# Patient Record
Sex: MALE | Race: WHITE | NOT HISPANIC OR LATINO | Employment: OTHER | URBAN - METROPOLITAN AREA
[De-identification: names, ages, dates, MRNs, and addresses within clinical notes are randomized per-mention and may not be internally consistent; named-entity substitution may affect disease eponyms.]

---

## 2018-10-23 ENCOUNTER — APPOINTMENT (OUTPATIENT)
Dept: LAB | Facility: MEDICAL CENTER | Age: 83
End: 2018-10-23
Payer: MEDICARE

## 2018-10-23 ENCOUNTER — TRANSCRIBE ORDERS (OUTPATIENT)
Dept: LAB | Facility: MEDICAL CENTER | Age: 83
End: 2018-10-23

## 2018-10-23 DIAGNOSIS — I10 HYPERTENSION, UNSPECIFIED TYPE: ICD-10-CM

## 2018-10-23 DIAGNOSIS — E78.5 HYPERLIPIDEMIA, UNSPECIFIED HYPERLIPIDEMIA TYPE: ICD-10-CM

## 2018-10-23 DIAGNOSIS — K21.9 GASTROESOPHAGEAL REFLUX DISEASE, ESOPHAGITIS PRESENCE NOT SPECIFIED: ICD-10-CM

## 2018-10-23 DIAGNOSIS — F03.90 DEMENTIA WITHOUT BEHAVIORAL DISTURBANCE, UNSPECIFIED DEMENTIA TYPE (HCC): Primary | ICD-10-CM

## 2018-10-23 DIAGNOSIS — F03.90 DEMENTIA WITHOUT BEHAVIORAL DISTURBANCE, UNSPECIFIED DEMENTIA TYPE (HCC): ICD-10-CM

## 2018-10-23 LAB
ALBUMIN SERPL BCP-MCNC: 3.9 G/DL (ref 3.5–5)
ALP SERPL-CCNC: 61 U/L (ref 46–116)
ALT SERPL W P-5'-P-CCNC: 24 U/L (ref 12–78)
ANION GAP SERPL CALCULATED.3IONS-SCNC: 6 MMOL/L (ref 4–13)
AST SERPL W P-5'-P-CCNC: 16 U/L (ref 5–45)
BILIRUB SERPL-MCNC: 0.75 MG/DL (ref 0.2–1)
BUN SERPL-MCNC: 11 MG/DL (ref 5–25)
CALCIUM SERPL-MCNC: 9.2 MG/DL (ref 8.3–10.1)
CHLORIDE SERPL-SCNC: 104 MMOL/L (ref 100–108)
CHOLEST SERPL-MCNC: 159 MG/DL (ref 50–200)
CO2 SERPL-SCNC: 31 MMOL/L (ref 21–32)
CREAT SERPL-MCNC: 0.97 MG/DL (ref 0.6–1.3)
ERYTHROCYTE [DISTWIDTH] IN BLOOD BY AUTOMATED COUNT: 12.9 % (ref 11.6–15.1)
GFR SERPL CREATININE-BSD FRML MDRD: 71 ML/MIN/1.73SQ M
GLUCOSE SERPL-MCNC: 82 MG/DL (ref 65–140)
HCT VFR BLD AUTO: 47.4 % (ref 36.5–49.3)
HDLC SERPL-MCNC: 34 MG/DL (ref 40–60)
HGB BLD-MCNC: 15.8 G/DL (ref 12–17)
LDLC SERPL CALC-MCNC: 73 MG/DL (ref 0–100)
MCH RBC QN AUTO: 30.9 PG (ref 26.8–34.3)
MCHC RBC AUTO-ENTMCNC: 33.3 G/DL (ref 31.4–37.4)
MCV RBC AUTO: 93 FL (ref 82–98)
NONHDLC SERPL-MCNC: 125 MG/DL
PLATELET # BLD AUTO: 178 THOUSANDS/UL (ref 149–390)
PMV BLD AUTO: 10.9 FL (ref 8.9–12.7)
POTASSIUM SERPL-SCNC: 4.2 MMOL/L (ref 3.5–5.3)
PROT SERPL-MCNC: 7.4 G/DL (ref 6.4–8.2)
RBC # BLD AUTO: 5.11 MILLION/UL (ref 3.88–5.62)
SODIUM SERPL-SCNC: 141 MMOL/L (ref 136–145)
TRIGL SERPL-MCNC: 260 MG/DL
WBC # BLD AUTO: 6.36 THOUSAND/UL (ref 4.31–10.16)

## 2018-10-23 PROCEDURE — 85027 COMPLETE CBC AUTOMATED: CPT

## 2018-10-23 PROCEDURE — 80053 COMPREHEN METABOLIC PANEL: CPT

## 2018-10-23 PROCEDURE — 80061 LIPID PANEL: CPT

## 2018-10-23 PROCEDURE — 36415 COLL VENOUS BLD VENIPUNCTURE: CPT

## 2021-07-03 ENCOUNTER — APPOINTMENT (EMERGENCY)
Dept: RADIOLOGY | Facility: HOSPITAL | Age: 86
End: 2021-07-03
Payer: MEDICARE

## 2021-07-03 ENCOUNTER — APPOINTMENT (EMERGENCY)
Dept: CT IMAGING | Facility: HOSPITAL | Age: 86
End: 2021-07-03
Payer: MEDICARE

## 2021-07-03 ENCOUNTER — HOSPITAL ENCOUNTER (EMERGENCY)
Facility: HOSPITAL | Age: 86
Discharge: HOME | End: 2021-07-03
Attending: EMERGENCY MEDICINE | Admitting: FAMILY MEDICINE
Payer: MEDICARE

## 2021-07-03 DIAGNOSIS — D72.829 LEUKOCYTOSIS: ICD-10-CM
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
## 2021-07-03 LAB
ANION GAP SERPL CALCULATED.3IONS-SCNC: 12 MMOL/L (ref 4–13)
APTT PPP: 29 SECONDS (ref 23–37)
ATRIAL RATE: 69 BPM
BACTERIA UR QL AUTO: ABNORMAL /HPF
BASOPHILS # BLD AUTO: 0 THOUSANDS/ΜL (ref 0–0.1)
BASOPHILS NFR BLD AUTO: 0 % (ref 0–2)
BILIRUB UR QL STRIP: NEGATIVE
BUN SERPL-MCNC: 10 MG/DL (ref 7–25)
CALCIUM SERPL-MCNC: 9.5 MG/DL (ref 8.6–10.5)
CHLORIDE SERPL-SCNC: 100 MMOL/L (ref 98–107)
CLARITY UR: CLEAR
CO2 SERPL-SCNC: 27 MMOL/L (ref 21–31)
COARSE GRAN CASTS URNS QL MICRO: ABNORMAL /LPF
COLOR UR: YELLOW
CREAT SERPL-MCNC: 1.26 MG/DL (ref 0.7–1.3)
EOSINOPHIL # BLD AUTO: 0 THOUSAND/ΜL (ref 0–0.61)
EOSINOPHIL NFR BLD AUTO: 0 % (ref 0–5)
ERYTHROCYTE [DISTWIDTH] IN BLOOD BY AUTOMATED COUNT: 13.1 % (ref 11.5–14.5)
GFR SERPL CREATININE-BSD FRML MDRD: 51 ML/MIN/1.73SQ M
GLUCOSE SERPL-MCNC: 128 MG/DL (ref 65–140)
GLUCOSE SERPL-MCNC: 129 MG/DL (ref 65–99)
GLUCOSE UR STRIP-MCNC: NEGATIVE MG/DL
HCT VFR BLD AUTO: 47.1 % (ref 42–47)
HGB BLD-MCNC: 16 G/DL (ref 14–18)
HGB UR QL STRIP.AUTO: ABNORMAL
INR PPP: 1.07 (ref 0.84–1.19)
KETONES UR STRIP-MCNC: NEGATIVE MG/DL
LEUKOCYTE ESTERASE UR QL STRIP: NEGATIVE
LYMPHOCYTES # BLD AUTO: 0.9 THOUSANDS/ΜL (ref 0.6–4.47)
LYMPHOCYTES NFR BLD AUTO: 5 % (ref 21–51)
MCH RBC QN AUTO: 30.7 PG (ref 26–34)
MCHC RBC AUTO-ENTMCNC: 33.9 G/DL (ref 31–37)
MCV RBC AUTO: 91 FL (ref 81–99)
MONOCYTES # BLD AUTO: 1.8 THOUSAND/ΜL (ref 0.17–1.22)
MONOCYTES NFR BLD AUTO: 10 % (ref 2–12)
MUCOUS THREADS UR QL AUTO: ABNORMAL
NEUTROPHILS # BLD AUTO: 15 THOUSANDS/ΜL (ref 1.4–6.5)
NEUTS SEG NFR BLD AUTO: 84 % (ref 42–75)
NITRITE UR QL STRIP: NEGATIVE
NON-SQ EPI CELLS URNS QL MICRO: ABNORMAL /HPF
P AXIS: 61 DEGREES
PH UR STRIP.AUTO: 7.5 [PH]
PLATELET # BLD AUTO: 196 THOUSANDS/UL (ref 149–390)
PMV BLD AUTO: 8.9 FL (ref 8.6–11.7)
POTASSIUM SERPL-SCNC: 3.7 MMOL/L (ref 3.5–5.5)
PR INTERVAL: 164 MS
PROT UR STRIP-MCNC: ABNORMAL MG/DL
PROTHROMBIN TIME: 13.8 SECONDS (ref 11.6–14.5)
QRS AXIS: 68 DEGREES
QRSD INTERVAL: 82 MS
QT INTERVAL: 398 MS
QTC INTERVAL: 426 MS
RBC # BLD AUTO: 5.21 MILLION/UL (ref 4.3–5.9)
RBC #/AREA URNS AUTO: ABNORMAL /HPF
SODIUM SERPL-SCNC: 139 MMOL/L (ref 134–143)
SP GR UR STRIP.AUTO: 1.02 (ref 1–1.03)
T WAVE AXIS: 64 DEGREES
UROBILINOGEN UR QL STRIP.AUTO: 0.2 E.U./DL
VENTRICULAR RATE: 69 BPM
WBC # BLD AUTO: 17.8 THOUSAND/UL (ref 4.8–10.8)
WBC #/AREA URNS AUTO: ABNORMAL /HPF

## 2021-07-03 PROCEDURE — 93010 ELECTROCARDIOGRAM REPORT: CPT | Performed by: INTERNAL MEDICINE

## 2021-07-03 PROCEDURE — 85610 PROTHROMBIN TIME: CPT | Performed by: EMERGENCY MEDICINE

## 2021-07-03 PROCEDURE — 85730 THROMBOPLASTIN TIME PARTIAL: CPT | Performed by: EMERGENCY MEDICINE

## 2021-07-03 PROCEDURE — 85025 COMPLETE CBC W/AUTO DIFF WBC: CPT | Performed by: EMERGENCY MEDICINE

## 2021-07-03 PROCEDURE — 71045 X-RAY EXAM CHEST 1 VIEW: CPT

## 2021-07-03 PROCEDURE — 99285 EMERGENCY DEPT VISIT HI MDM: CPT | Performed by: FAMILY MEDICINE

## 2021-07-03 PROCEDURE — 72125 CT NECK SPINE W/O DYE: CPT

## 2021-07-03 PROCEDURE — 82948 REAGENT STRIP/BLOOD GLUCOSE: CPT

## 2021-07-03 PROCEDURE — 99284 EMERGENCY DEPT VISIT MOD MDM: CPT

## 2021-07-03 PROCEDURE — 80048 BASIC METABOLIC PNL TOTAL CA: CPT | Performed by: EMERGENCY MEDICINE

## 2021-07-03 PROCEDURE — 81003 URINALYSIS AUTO W/O SCOPE: CPT | Performed by: EMERGENCY MEDICINE

## 2021-07-03 PROCEDURE — 36415 COLL VENOUS BLD VENIPUNCTURE: CPT | Performed by: EMERGENCY MEDICINE

## 2021-07-03 PROCEDURE — 81001 URINALYSIS AUTO W/SCOPE: CPT | Performed by: EMERGENCY MEDICINE

## 2021-07-03 PROCEDURE — 70450 CT HEAD/BRAIN W/O DYE: CPT

## 2021-07-03 PROCEDURE — 93005 ELECTROCARDIOGRAM TRACING: CPT

## 2021-07-03 PROCEDURE — 73552 X-RAY EXAM OF FEMUR 2/>: CPT

## 2021-07-03 NOTE — ED PROVIDER NOTES
Emergency Department Trauma Note  Domonique Leon 80 y o  male MRN: [id number]  Unit/Bed#: TR 05/TR 05 Encounter: [id number]      Trauma Alert: Trauma Acuity: Trauma Evaluation  Model of Arrival:   via    Trauma Team: Current Providers  Attending Provider: Myles Powers MD  Attending Provider: Theresa Merino MD  Registered Nurse: Conchita Cardozo RN  Consultants: None      History of Present Illness     Chief Complaint:   Chief Complaint   Patient presents with    Fall     HPI:  Domonique Leon is a 80 y o  male who presents with fall  Fall was unwitnessed patient has severe dementia so history is limited secondary to dementia  As per EMS the patient was found on the floor  He did complain of neck pain C-collar was placed by EMS prior to arrival   Patient complains of left-sided leg pain denies any headache blurry vision double vision  Denies any chest pain or shortness of breath  Denies any abdominal pain nausea vomiting or diarrhea  He is awake alert oriented at baseline as per EMS and family  GCS 15  Mechanism:Details of Incident: patient fell at home  onto floor Injury Date: 07/03/21 Injury Time: 2444      HPI  Review of Systems   Constitutional: Negative  HENT: Negative  Eyes: Negative  Respiratory: Negative  Cardiovascular: Negative  Gastrointestinal: Negative  Endocrine: Negative  Genitourinary: Negative  Musculoskeletal: Positive for neck pain  Left thigh pain    Skin: Negative  Neurological: Negative  Psychiatric/Behavioral: Negative  Historical Information     Immunizations: There is no immunization history on file for this patient  No past medical history on file  No family history on file  No past surgical history on file  Social History     Tobacco Use    Smoking status: Not on file   Substance Use Topics    Alcohol use: Not on file    Drug use: Not on file     No existing history information found    No existing history information found  Family History: non-contributory    Meds/Allergies   None       Not on File    PHYSICAL EXAM    PE limited by:     Objective   Vitals:   First set: Temperature: 98 3 °F (36 8 °C) (07/03/21 0711)  Pulse: 68 (07/03/21 0711)  Respirations: 20 (07/03/21 0711)  Blood Pressure: 148/78 (07/03/21 0711)  SpO2: 94 % (07/03/21 0711)    Primary Survey:   (A) Airway:  Patent no blood in the oropharynx  (B) Breathing:  Lungs are clear to auscultation bilateral  (C) Circulation: Pulses:   normal  (D) Disabliity:  GCS Total:  15  (E) Expose:  Completed    Secondary Survey: (Click on Physical Exam tab above)  Physical Exam  Vitals and nursing note reviewed  Constitutional:       Appearance: Normal appearance  HENT:      Head: Normocephalic and atraumatic  Nose: Nose normal    Eyes:      Extraocular Movements: Extraocular movements intact  Conjunctiva/sclera: Conjunctivae normal       Pupils: Pupils are equal, round, and reactive to light  Cardiovascular:      Rate and Rhythm: Normal rate and regular rhythm  Pulmonary:      Effort: Pulmonary effort is normal       Breath sounds: Normal breath sounds  Abdominal:      General: Bowel sounds are normal       Palpations: Abdomen is soft  Musculoskeletal:         General: Tenderness (left thigh :  Tenderness to palpation able to lift his leg for few sec  States that he normally has cramps in his legs ) present  Cervical back: Neck supple  Tenderness present  Skin:     General: Skin is warm  Neurological:      General: No focal deficit present  Mental Status: He is alert and oriented to person, place, and time  Psychiatric:         Mood and Affect: Mood normal          Behavior: Behavior normal          Cervical spine cleared by clinical criteria? No (imaging required)    Cervical spine cleared at 8:28 a m  By me    Invasive Devices     None                 Lab Results:   Results Reviewed     Procedure Component Value Units Date/Time    Urine Microscopic [[id number]]  (Abnormal) Collected: 07/03/21 0922    Lab Status: Final result Specimen: Urine, Clean Catch Updated: 07/03/21 0956     RBC, UA 10-20 /hpf      WBC, UA 4-10 /hpf      Epithelial Cells Moderate /hpf      Bacteria, UA Occasional /hpf      COARSE GRANULAR CASTS 2-3 /lpf      MUCUS THREADS Moderate    UA w Reflex to Microscopic w Reflex to Culture [[id number]]  (Abnormal) Collected: 07/03/21 0922    Lab Status: Final result Specimen: Urine, Clean Catch Updated: 07/03/21 0930     Color, UA Yellow     Clarity, UA Clear     Specific [place], UA 1 020     pH, UA 7 5     Leukocytes, UA Negative     Nitrite, UA Negative     Protein, UA 2+ mg/dl      Glucose, UA Negative mg/dl      Ketones, UA Negative mg/dl      Urobilinogen, UA 0 2 E U /dl      Bilirubin, UA Negative     Blood, UA 1+    Basic metabolic panel [[id number]]  (Abnormal) Collected: 07/03/21 0808    Lab Status: Final result Specimen: Blood from Arm, Right Updated: 07/03/21 0841     Sodium 139 mmol/L      Potassium 3 7 mmol/L      Chloride 100 mmol/L      CO2 27 mmol/L      ANION GAP 12 mmol/L      BUN 10 mg/dL      Creatinine 1 26 mg/dL      Glucose 129 mg/dL      Calcium 9 5 mg/dL      eGFR 51 ml/min/1 73sq m     Narrative:      [place] guidelines for Chronic Kidney Disease (CKD):     Stage 1 with normal or high GFR (GFR > 90 mL/min/1 73 square meters)    Stage 2 Mild CKD (GFR = 60-89 mL/min/1 73 square meters)    Stage 3A Moderate CKD (GFR = 45-59 mL/min/1 73 square meters)    Stage 3B Moderate CKD (GFR = 30-44 mL/min/1 73 square meters)    Stage 4 Severe CKD (GFR = 15-29 mL/min/1 73 square meters)    Stage 5 End Stage CKD (GFR <15 mL/min/1 73 square meters)  Note: GFR calculation is accurate only with a steady state creatinine    Protime-INR [[id number]]  (Normal) Collected: 07/03/21 0808    Lab Status: Final result Specimen: Blood from Arm, Right Updated: 07/03/21 0830     Protime 13 8 seconds      INR 1  07    APTT [[id number]]  (Normal) Collected: 07/03/21 0808    Lab Status: Final result Specimen: Blood from Arm, Right Updated: 07/03/21 0830     PTT 29 seconds     CBC and differential [[id number]]  (Abnormal) Collected: 07/03/21 0808    Lab Status: Final result Specimen: Blood from Arm, Right Updated: 07/03/21 0828     WBC 17 80 Thousand/uL      RBC 5 21 Million/uL      Hemoglobin 16 0 g/dL      Hematocrit 47 1 %      MCV 91 fL      MCH 30 7 pg      MCHC 33 9 g/dL      RDW 13 1 %      MPV 8 9 fL      Platelets 359 Thousands/uL      Neutrophils Relative 84 %      Lymphocytes Relative 5 %      Monocytes Relative 10 %      Eosinophils Relative 0 %      Basophils Relative 0 %      Neutrophils Absolute 15 00 Thousands/µL      Lymphocytes Absolute 0 90 Thousands/µL      Monocytes Absolute 1 80 Thousand/µL      Eosinophils Absolute 0 00 Thousand/µL      Basophils Absolute 0 00 Thousands/µL     Fingerstick Glucose (POCT) [[id number]]  (Normal) Collected: 07/03/21 0815    Lab Status: Final result Updated: 07/03/21 0818     POC Glucose 128 mg/dl                  Imaging Studies:   Direct to CT: No  XR femur 2 views LEFT   Final Result by Berenice Perry MD (07/03 0820)      No acute osseous abnormality  Workstation performed: [id number]         TRAUMA - CT head wo contrast   Final Result by Darby Lara MD (12/96 6126)      No acute intracranial abnormality  Microangiopathic changes  The study was marked in EPIC for immediate notification given its trauma status  Workstation performed: [id number]         TRAUMA - CT spine cervical wo contrast   Final Result by Darby Lara MD (07/03 7367)      No cervical spine fracture or traumatic malalignment  The study was marked in EPIC for immediate notification given its trauma status        Workstation performed: [id number]         XR Trauma chest portable   Final Result by Berenice Perry MD (07/03 3172)      No acute cardiopulmonary disease  Workstation performed: [id number]               Procedures  Procedures         ED Course  ED Course as of Jul 03 1002   Sat Jul 03, 2021   9129 Labs chest x-ray/CT scan discussed with patient recommending to repeat CBC to trend leukocytosis  Patient blood work within normal limits  Urine is negative for infection  Discussed with family and the granddaughter who is by bedside recommending follow-up with PCP in 24-48 hours repeat blood work return to the ED symptom does not improve or worsen  MDM        Disposition  [facility] Transfer: No  Final diagnoses:   Fall, initial encounter   Leukocytosis     Time reflects when diagnosis was documented in both MDM as applicable and the Disposition within this note     Time User Action Codes Description Comment    7/3/2021  9:59 AM Lucia Quarry Add [U10  XXXA] Fall, initial encounter     7/3/2021  9:59 AM Lucia Quarry Add [T96 721] Leukocytosis       ED Disposition     ED Disposition Condition Date/Time Comment    Discharge Stable Sat Jul 3, 2021  9:59 AM Malena Holm discharge to home/self care  Follow-up Information     Follow up With Specialties Details Why [address], DO Emergency Medicine, Internal Medicine Schedule an appointment as soon as possible for a visit in 2 days If symptoms worsen Fady Cantu [phone] [facility]  [phone]          Patient's Medications    No medications on file     Outpatient Discharge Orders   CBC and differential   Standing Status: Future Standing Exp   Date: 07/03/22       PDMP Review     None          ED Provider  Electronically Signed by         Kumra Mercer MD  07/03/21 1002

## 2021-07-05 VITALS
RESPIRATION RATE: 20 BRPM | OXYGEN SATURATION: 95 % | HEART RATE: 64 BPM | SYSTOLIC BLOOD PRESSURE: 168 MMHG | TEMPERATURE: 98.2 F | DIASTOLIC BLOOD PRESSURE: 81 MMHG | WEIGHT: 168 LBS | BODY MASS INDEX: 24.11 KG/M2

## 2021-07-09 ENCOUNTER — APPOINTMENT (OUTPATIENT)
Dept: LAB | Facility: MEDICAL CENTER | Age: 86
End: 2021-07-09
Payer: MEDICARE

## 2021-07-09 DIAGNOSIS — D72.829 LEUKOCYTOSIS: ICD-10-CM

## 2021-07-09 DIAGNOSIS — F03.90 DEMENTIA WITHOUT BEHAVIORAL DISTURBANCE, UNSPECIFIED DEMENTIA TYPE (HCC): ICD-10-CM

## 2021-07-09 DIAGNOSIS — D72.829 LEUKOCYTOSIS, UNSPECIFIED TYPE: ICD-10-CM

## 2021-07-09 DIAGNOSIS — I10 HYPERTENSION, UNSPECIFIED TYPE: ICD-10-CM
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
## 2021-07-09 LAB
ANION GAP SERPL CALCULATED.3IONS-SCNC: 1 MMOL/L (ref 4–13)
BASOPHILS # BLD AUTO: 0.04 THOUSANDS/ΜL (ref 0–0.1)
BASOPHILS NFR BLD AUTO: 1 % (ref 0–1)
BUN SERPL-MCNC: 12 MG/DL (ref 5–25)
CALCIUM SERPL-MCNC: 9.3 MG/DL (ref 8.3–10.1)
CHLORIDE SERPL-SCNC: 106 MMOL/L (ref 100–108)
CO2 SERPL-SCNC: 31 MMOL/L (ref 21–32)
CREAT SERPL-MCNC: 0.98 MG/DL (ref 0.6–1.3)
EOSINOPHIL # BLD AUTO: 0.06 THOUSAND/ΜL (ref 0–0.61)
EOSINOPHIL NFR BLD AUTO: 1 % (ref 0–6)
ERYTHROCYTE [DISTWIDTH] IN BLOOD BY AUTOMATED COUNT: 12.7 % (ref 11.6–15.1)
GFR SERPL CREATININE-BSD FRML MDRD: 69 ML/MIN/1.73SQ M
GLUCOSE SERPL-MCNC: 121 MG/DL (ref 65–140)
HCT VFR BLD AUTO: 43.9 % (ref 36.5–49.3)
HGB BLD-MCNC: 14.5 G/DL (ref 12–17)
IMM GRANULOCYTES # BLD AUTO: 0.02 THOUSAND/UL (ref 0–0.2)
IMM GRANULOCYTES NFR BLD AUTO: 0 % (ref 0–2)
LYMPHOCYTES # BLD AUTO: 1.05 THOUSANDS/ΜL (ref 0.6–4.47)
LYMPHOCYTES NFR BLD AUTO: 16 % (ref 14–44)
MCH RBC QN AUTO: 31.1 PG (ref 26.8–34.3)
MCHC RBC AUTO-ENTMCNC: 33 G/DL (ref 31.4–37.4)
MCV RBC AUTO: 94 FL (ref 82–98)
MONOCYTES # BLD AUTO: 0.59 THOUSAND/ΜL (ref 0.17–1.22)
MONOCYTES NFR BLD AUTO: 9 % (ref 4–12)
NEUTROPHILS # BLD AUTO: 4.8 THOUSANDS/ΜL (ref 1.85–7.62)
NEUTS SEG NFR BLD AUTO: 73 % (ref 43–75)
NRBC BLD AUTO-RTO: 0 /100 WBCS
PLATELET # BLD AUTO: 207 THOUSANDS/UL (ref 149–390)
PMV BLD AUTO: 10.9 FL (ref 8.9–12.7)
POTASSIUM SERPL-SCNC: 4.1 MMOL/L (ref 3.5–5.3)
RBC # BLD AUTO: 4.66 MILLION/UL (ref 3.88–5.62)
SODIUM SERPL-SCNC: 138 MMOL/L (ref 136–145)
WBC # BLD AUTO: 6.56 THOUSAND/UL (ref 4.31–10.16)

## 2021-07-09 PROCEDURE — 80048 BASIC METABOLIC PNL TOTAL CA: CPT

## 2021-07-09 PROCEDURE — 36415 COLL VENOUS BLD VENIPUNCTURE: CPT

## 2021-07-09 PROCEDURE — 85025 COMPLETE CBC W/AUTO DIFF WBC: CPT

## 2022-01-01 ENCOUNTER — HOME CARE VISIT (OUTPATIENT)
Dept: HOME HOSPICE | Facility: HOSPICE | Age: 87
End: 2022-01-01

## 2022-01-01 ENCOUNTER — HOME CARE VISIT (OUTPATIENT)
Dept: HOME HEALTH SERVICES | Facility: HOME HEALTHCARE | Age: 87
End: 2022-01-01

## 2022-01-01 VITALS — HEART RATE: 100 BPM | TEMPERATURE: 97 F | RESPIRATION RATE: 30 BRPM

## 2022-01-01 VITALS — RESPIRATION RATE: 16 BRPM | TEMPERATURE: 96.2 F | HEART RATE: 100 BPM

## 2022-01-01 VITALS — TEMPERATURE: 97 F | RESPIRATION RATE: 18 BRPM | HEART RATE: 68 BPM

## 2022-10-25 ENCOUNTER — HOSPITAL ENCOUNTER (EMERGENCY)
Facility: HOSPITAL | Age: 87
Discharge: HOME | End: 2022-10-26
Attending: EMERGENCY MEDICINE
Payer: MEDICARE

## 2022-10-25 DIAGNOSIS — Z00.8 ENCOUNTER FOR PSYCHOLOGICAL EVALUATION: ICD-10-CM

## 2022-10-25 DIAGNOSIS — F03.90 DEMENTIA (HCC): Primary | ICD-10-CM

## 2022-10-25 PROCEDURE — 82075 ASSAY OF BREATH ETHANOL: CPT

## 2022-10-26 VITALS
OXYGEN SATURATION: 98 % | SYSTOLIC BLOOD PRESSURE: 175 MMHG | HEART RATE: 72 BPM | TEMPERATURE: 98.8 F | BODY MASS INDEX: 22.96 KG/M2 | DIASTOLIC BLOOD PRESSURE: 91 MMHG | HEIGHT: 70 IN | RESPIRATION RATE: 18 BRPM

## 2022-10-26 LAB
ALBUMIN SERPL BCP-MCNC: 3.4 G/DL (ref 3.5–5)
ALP SERPL-CCNC: 61 U/L (ref 46–116)
ALT SERPL W P-5'-P-CCNC: 15 U/L (ref 12–78)
AMPHETAMINES SERPL QL SCN: NEGATIVE
ANION GAP SERPL CALCULATED.3IONS-SCNC: 8 MMOL/L (ref 4–13)
AST SERPL W P-5'-P-CCNC: 10 U/L (ref 5–45)
ATRIAL RATE: 73 BPM
BACTERIA UR QL AUTO: NORMAL /HPF
BARBITURATES UR QL: NEGATIVE
BASOPHILS # BLD AUTO: 0.04 THOUSANDS/ÂΜL (ref 0–0.1)
BASOPHILS NFR BLD AUTO: 1 % (ref 0–1)
BENZODIAZ UR QL: NEGATIVE
BILIRUB SERPL-MCNC: 0.38 MG/DL (ref 0.2–1)
BILIRUB UR QL STRIP: NEGATIVE
BUN SERPL-MCNC: 20 MG/DL (ref 5–25)
CALCIUM ALBUM COR SERPL-MCNC: 9.1 MG/DL (ref 8.3–10.1)
CALCIUM SERPL-MCNC: 8.6 MG/DL (ref 8.3–10.1)
CHLORIDE SERPL-SCNC: 105 MMOL/L (ref 96–108)
CLARITY UR: CLEAR
CO2 SERPL-SCNC: 30 MMOL/L (ref 21–32)
COCAINE UR QL: NEGATIVE
COLOR UR: YELLOW
CREAT SERPL-MCNC: 1.07 MG/DL (ref 0.6–1.3)
EOSINOPHIL # BLD AUTO: 0.17 THOUSAND/ÂΜL (ref 0–0.61)
EOSINOPHIL NFR BLD AUTO: 2 % (ref 0–6)
ERYTHROCYTE [DISTWIDTH] IN BLOOD BY AUTOMATED COUNT: 12.9 % (ref 11.6–15.1)
ETHANOL EXG-MCNC: 0 MG/DL
ETHANOL SERPL-MCNC: <3 MG/DL (ref 0–3)
GFR SERPL CREATININE-BSD FRML MDRD: 61 ML/MIN/1.73SQ M
GLUCOSE SERPL-MCNC: 103 MG/DL (ref 65–140)
GLUCOSE UR STRIP-MCNC: NEGATIVE MG/DL
HCT VFR BLD AUTO: 45.5 % (ref 36.5–49.3)
HGB BLD-MCNC: 15.1 G/DL (ref 12–17)
HGB UR QL STRIP.AUTO: NEGATIVE
IMM GRANULOCYTES # BLD AUTO: 0.03 THOUSAND/UL (ref 0–0.2)
IMM GRANULOCYTES NFR BLD AUTO: 0 % (ref 0–2)
KETONES UR STRIP-MCNC: NEGATIVE MG/DL
LEUKOCYTE ESTERASE UR QL STRIP: NEGATIVE
LYMPHOCYTES # BLD AUTO: 0.99 THOUSANDS/ÂΜL (ref 0.6–4.47)
LYMPHOCYTES NFR BLD AUTO: 11 % (ref 14–44)
MCH RBC QN AUTO: 30.9 PG (ref 26.8–34.3)
MCHC RBC AUTO-ENTMCNC: 33.2 G/DL (ref 31.4–37.4)
MCV RBC AUTO: 93 FL (ref 82–98)
METHADONE UR QL: NEGATIVE
MONOCYTES # BLD AUTO: 0.76 THOUSAND/ÂΜL (ref 0.17–1.22)
MONOCYTES NFR BLD AUTO: 9 % (ref 4–12)
NEUTROPHILS # BLD AUTO: 6.77 THOUSANDS/ÂΜL (ref 1.85–7.62)
NEUTS SEG NFR BLD AUTO: 77 % (ref 43–75)
NITRITE UR QL STRIP: NEGATIVE
NON-SQ EPI CELLS URNS QL MICRO: NORMAL /HPF
NRBC BLD AUTO-RTO: 0 /100 WBCS
OPIATES UR QL SCN: NEGATIVE
OXYCODONE+OXYMORPHONE UR QL SCN: NEGATIVE
P AXIS: 45 DEGREES
PCP UR QL: NEGATIVE
PH UR STRIP.AUTO: 7 [PH]
PLATELET # BLD AUTO: 196 THOUSANDS/UL (ref 149–390)
PMV BLD AUTO: 9.9 FL (ref 8.9–12.7)
POTASSIUM SERPL-SCNC: 3.5 MMOL/L (ref 3.5–5.3)
PR INTERVAL: 152 MS
PROT SERPL-MCNC: 7 G/DL (ref 6.4–8.4)
PROT UR STRIP-MCNC: ABNORMAL MG/DL
QRS AXIS: 10 DEGREES
QRSD INTERVAL: 74 MS
QT INTERVAL: 366 MS
QTC INTERVAL: 403 MS
RBC # BLD AUTO: 4.89 MILLION/UL (ref 3.88–5.62)
RBC #/AREA URNS AUTO: NORMAL /HPF
SARS-COV-2 RNA RESP QL NAA+PROBE: NEGATIVE
SODIUM SERPL-SCNC: 143 MMOL/L (ref 135–147)
SP GR UR STRIP.AUTO: 1.02 (ref 1–1.03)
T WAVE AXIS: 59 DEGREES
THC UR QL: NEGATIVE
TSH SERPL DL<=0.05 MIU/L-ACNC: 2.61 UIU/ML (ref 0.45–4.5)
UROBILINOGEN UR QL STRIP.AUTO: 0.2 E.U./DL
VENTRICULAR RATE: 73 BPM
WBC # BLD AUTO: 8.76 THOUSAND/UL (ref 4.31–10.16)
WBC #/AREA URNS AUTO: NORMAL /HPF

## 2022-10-26 PROCEDURE — 80307 DRUG TEST PRSMV CHEM ANLYZR: CPT

## 2022-10-26 PROCEDURE — 93005 ELECTROCARDIOGRAM TRACING: CPT

## 2022-10-26 PROCEDURE — 81001 URINALYSIS AUTO W/SCOPE: CPT

## 2022-10-26 PROCEDURE — U0003 INFECTIOUS AGENT DETECTION BY NUCLEIC ACID (DNA OR RNA); SEVERE ACUTE RESPIRATORY SYNDROME CORONAVIRUS 2 (SARS-COV-2) (CORONAVIRUS DISEASE [COVID-19]), AMPLIFIED PROBE TECHNIQUE, MAKING USE OF HIGH THROUGHPUT TECHNOLOGIES AS DESCRIBED BY CMS-2020-01-R: HCPCS

## 2022-10-26 PROCEDURE — 84443 ASSAY THYROID STIM HORMONE: CPT

## 2022-10-26 PROCEDURE — 93010 ELECTROCARDIOGRAM REPORT: CPT | Performed by: INTERNAL MEDICINE

## 2022-10-26 PROCEDURE — 36415 COLL VENOUS BLD VENIPUNCTURE: CPT

## 2022-10-26 PROCEDURE — 85025 COMPLETE CBC W/AUTO DIFF WBC: CPT

## 2022-10-26 PROCEDURE — 80053 COMPREHEN METABOLIC PANEL: CPT

## 2022-10-26 PROCEDURE — U0005 INFEC AGEN DETEC AMPLI PROBE: HCPCS

## 2022-10-26 PROCEDURE — 82077 ASSAY SPEC XCP UR&BREATH IA: CPT

## 2022-10-26 RX ORDER — QUETIAPINE FUMARATE 25 MG/1
25 TABLET, FILM COATED ORAL ONCE
Status: COMPLETED | OUTPATIENT
Start: 2022-10-26 | End: 2022-10-26

## 2022-10-26 RX ORDER — HALOPERIDOL 5 MG/ML
2 INJECTION INTRAMUSCULAR ONCE
Status: COMPLETED | OUTPATIENT
Start: 2022-10-26 | End: 2022-10-26

## 2022-10-26 RX ORDER — LANOLIN ALCOHOL/MO/W.PET/CERES
3 CREAM (GRAM) TOPICAL
Status: DISCONTINUED | OUTPATIENT
Start: 2022-10-26 | End: 2022-10-26 | Stop reason: HOSPADM

## 2022-10-26 RX ADMIN — QUETIAPINE FUMARATE 25 MG: 25 TABLET ORAL at 02:22

## 2022-10-26 RX ADMIN — Medication 3 MG: at 02:22

## 2022-10-26 RX ADMIN — HALOPERIDOL LACTATE 2 MG: 5 INJECTION, SOLUTION INTRAMUSCULAR at 01:43
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
## 2022-10-26 NOTE — ED PROVIDER NOTES
History  Chief Complaint   Patient presents with   • Psychiatric Evaluation     Jennifer box pt from dementia unit  Per EMS pt was asking for a gun and knife  And was making threats to kill himself and his daughter  When asked if pt has any SI/HI, pt states "your crazy"     Patient is an 80year-old patient presenting from dementia unit after requesting a gun and a knife in an apparent attempt to kill himself  The patient is very confused, consistent with his baseline dementia  According to EMS he was making threats to kill himself and his daughter  Upon presentation to the emergency department the patient denies all suicidal and homicidal ideation and was surprised to learn that he made these threats tonight  The patient is pleasantly confused at this time and, again, denies all thoughts of suicide or homicide  He also denies any visual or auditory hallucinations  Unable to provide thorough history as patient has baseline dementia  None       Past Medical History:   Diagnosis Date   • Hypertension        History reviewed  No pertinent surgical history  History reviewed  No pertinent family history  I have reviewed and agree with the history as documented      E-Cigarette/Vaping   • E-Cigarette Use Never User      E-Cigarette/Vaping Substances     Social History     Tobacco Use   • Smoking status: Never Smoker   • Smokeless tobacco: Never Used   Vaping Use   • Vaping Use: Never used        Review of Systems   Unable to perform ROS: Dementia       Physical Exam  ED Triage Vitals [10/25/22 2335]   Temperature Pulse Respirations Blood Pressure SpO2   98 8 °F (37 1 °C) 72 18 (!) 211/103 98 %      Temp Source Heart Rate Source Patient Position - Orthostatic VS BP Location FiO2 (%)   Temporal Monitor Lying Right arm --      Pain Score       --             Orthostatic Vital Signs  Vitals:    10/25/22 2335 10/26/22 0023   BP: (!) 211/103 (!) 175/91   Pulse: 72    Patient Position - Orthostatic VS: Lying Physical Exam  Vitals and nursing note reviewed  Constitutional:       General: He is not in acute distress  Appearance: Normal appearance  He is well-developed  He is not ill-appearing  HENT:      Head: Normocephalic and atraumatic  Right Ear: External ear normal       Left Ear: External ear normal    Eyes:      Extraocular Movements: Extraocular movements intact  Conjunctiva/sclera: Conjunctivae normal       Pupils: Pupils are equal, round, and reactive to light  Cardiovascular:      Rate and Rhythm: Normal rate and regular rhythm  Pulses: Normal pulses  Heart sounds: Normal heart sounds  No murmur heard  Pulmonary:      Effort: Pulmonary effort is normal  No respiratory distress  Breath sounds: Normal breath sounds  No wheezing, rhonchi or rales  Abdominal:      General: Abdomen is flat  Palpations: Abdomen is soft  Tenderness: There is no abdominal tenderness  There is no guarding or rebound  Musculoskeletal:         General: No swelling or deformity  Normal range of motion  Cervical back: Normal range of motion and neck supple  No tenderness  Right lower leg: No edema  Left lower leg: No edema  Skin:     General: Skin is warm and dry  Capillary Refill: Capillary refill takes less than 2 seconds  Findings: No bruising, erythema or rash  Neurological:      Mental Status: He is alert  Mental status is at baseline  He is disoriented           ED Medications  Medications - No data to display    Diagnostic Studies  Results Reviewed     Procedure Component Value Units Date/Time    COVID only [[id number]]     Lab Status: No result Specimen: Nares from Nose     Rapid drug screen, urine [[id number]]     Lab Status: No result Specimen: Urine     POCT alcohol breath test [[id number]]     Lab Status: No result     CBC and differential [[id number]]     Lab Status: No result Specimen: Blood     Comprehensive metabolic panel [[id number]]     Lab Status: No result Specimen: Blood     TSH [[id number]]     Lab Status: No result Specimen: Blood     Ethanol [[id number]]     Lab Status: No result Specimen: Blood     UA w Reflex to Microscopic w Reflex to Culture [[id number]]     Lab Status: No result Specimen: Urine                  No orders to display         Procedures  Procedures      ED Course                             SBIRT 20yo+    Flowsheet Row Most Recent Value   SBIRT (25 yo +)    In order to provide better care to our patients, we are screening all of our patients for alcohol and drug use  Would it be okay to ask you these screening questions? Yes Filed at: 10/25/2022 2338   Initial Alcohol Screen: US AUDIT-C     1  How often do you have a drink containing alcohol? 0 Filed at: 10/25/2022 2338   2  How many drinks containing alcohol do you have on a typical day you are drinking? 0 Filed at: 10/25/2022 2338   3a  Male UNDER 65: How often do you have five or more drinks on one occasion? 0 Filed at: 10/25/2022 2338   Audit-C Score 0 Filed at: 10/25/2022 2338   ESTEFANY: How many times in the past year have you    Used an illegal drug or used a prescription medication for non-medical reasons? Never Filed at: 10/25/2022 2338                MDM  Number of Diagnoses or Management Options  Diagnosis management comments: 80M with PMH of dementia brought to the emergency department after making suicidal and homicidal thoughts at his dementia care unit tonight  On presentation the patient is at baseline confused state and denies all suicidal or homicidal ideation at this time  Workup in the emergency department includes behavioral health workup, however while awaiting results of this workup the patient is signed out under the care of Dr Janet Valladares      Disposition  Final diagnoses:   None     ED Disposition     None      Follow-up Information    None         Patient's Medications    No medications on file     No discharge procedures on file      PDMP Review None           ED Provider  Attending physically available and evaluated Corona Tineo I managed the patient along with the ED Attending      Electronically Signed by         Jovanny Damon DO  10/26/22 5622

## 2022-10-26 NOTE — ED NOTES
Called [facility] to set up pt transport back to [facility]   They will call back with p/u time     Sherrill Huynh RN  10/26/22 0615

## 2022-10-26 NOTE — ED ATTENDING ATTESTATION
10/25/2022  IFior DO, saw and evaluated the patient  I have discussed the patient with the resident/non-physician practitioner and agree with the resident's/non-physician practitioner's findings, Plan of Care, and MDM as documented in the resident's/non-physician practitioner's note, except where noted  All available labs and Radiology studies were reviewed  I was present for key portions of any procedure(s) performed by the resident/non-physician practitioner and I was immediately available to provide assistance  At this point I agree with the current assessment done in the Emergency Department  I have conducted an independent evaluation of this patient a history and physical is as follows:    60-year-old male presents from dementia unit of nursing facility after making statements of self-harm  Per step facility patient was allegedly asking for a gun and a knife making threats to hurt himself and a family member  When asked if patient knows why he is here, he does not know why  When asked if patient made statements about hurting himself, patient states "that does not sound like me, I don't remember that"  Patient is alert to his name only  Patient otherwise has no complaints at this time, denies headaches, chest pain, abdominal pain  Patient does not previously have Behavioral Health diagnoses  A/p:  60-year-old male presents from dementia unit after making statements of self-harm  Patient is alert to name only  Patient denies making statements of SI or HI  Will evaluate with labs including TSH, urinalysis, EKG  Physical Exam  Vitals reviewed  Constitutional:       General: He is not in acute distress  Appearance: Normal appearance  He is not ill-appearing, toxic-appearing or diaphoretic  Comments: Hard of hearing   HENT:      Head: Normocephalic and atraumatic        Right Ear: External ear normal       Left Ear: External ear normal       Nose: Nose normal  Mouth/Throat:      Mouth: Mucous membranes are dry  Eyes:      General: No scleral icterus  Right eye: No discharge  Left eye: No discharge  Extraocular Movements: Extraocular movements intact  Cardiovascular:      Rate and Rhythm: Normal rate and regular rhythm  Pulmonary:      Effort: Pulmonary effort is normal  No respiratory distress  Abdominal:      General: There is no distension  Palpations: Abdomen is soft  Tenderness: There is no abdominal tenderness  There is no guarding or rebound  Musculoskeletal:         General: No deformity or signs of injury  Right lower leg: No edema  Left lower leg: No edema  Skin:     General: Skin is warm  Coloration: Skin is not jaundiced or pale  Neurological:      General: No focal deficit present  Mental Status: He is alert        Comments: Alert to name, cannot describe location/type of building we are in, states year as 1901; moves all extremities freely           ED Course         Critical Care Time  Procedures

## 2022-10-27 ENCOUNTER — HOSPITAL ENCOUNTER (INPATIENT)
Facility: HOSPITAL | Age: 87
LOS: 7 days | Discharge: HOSPICE | End: 2022-11-04
Attending: EMERGENCY MEDICINE | Admitting: INTERNAL MEDICINE

## 2022-10-27 ENCOUNTER — APPOINTMENT (EMERGENCY)
Dept: CT IMAGING | Facility: HOSPITAL | Age: 87
End: 2022-10-27

## 2022-10-27 ENCOUNTER — APPOINTMENT (EMERGENCY)
Dept: RADIOLOGY | Facility: HOSPITAL | Age: 87
End: 2022-10-27

## 2022-10-27 DIAGNOSIS — K52.9 ACUTE COLITIS: ICD-10-CM

## 2022-10-27 DIAGNOSIS — F03.C11 SEVERE DEMENTIA WITH AGITATION, UNSPECIFIED DEMENTIA TYPE: ICD-10-CM

## 2022-10-27 DIAGNOSIS — K52.9 COLITIS: Primary | ICD-10-CM

## 2022-10-27 DIAGNOSIS — E87.20 LACTIC ACIDOSIS: ICD-10-CM

## 2022-10-27 DIAGNOSIS — R78.81 BACTEREMIA: ICD-10-CM

## 2022-10-27 LAB
BASOPHILS # BLD AUTO: 0.05 THOUSANDS/ÂΜL (ref 0–0.1)
BASOPHILS NFR BLD AUTO: 0 % (ref 0–1)
CARDIAC TROPONIN I PNL SERPL HS: 10 NG/L
EOSINOPHIL # BLD AUTO: 0.11 THOUSAND/ÂΜL (ref 0–0.61)
EOSINOPHIL NFR BLD AUTO: 1 % (ref 0–6)
ERYTHROCYTE [DISTWIDTH] IN BLOOD BY AUTOMATED COUNT: 13.4 % (ref 11.6–15.1)
HCT VFR BLD AUTO: 45.5 % (ref 36.5–49.3)
HGB BLD-MCNC: 14.8 G/DL (ref 12–17)
IMM GRANULOCYTES # BLD AUTO: 0.05 THOUSAND/UL (ref 0–0.2)
IMM GRANULOCYTES NFR BLD AUTO: 0 % (ref 0–2)
LYMPHOCYTES # BLD AUTO: 1.01 THOUSANDS/ÂΜL (ref 0.6–4.47)
LYMPHOCYTES NFR BLD AUTO: 9 % (ref 14–44)
MCH RBC QN AUTO: 30.9 PG (ref 26.8–34.3)
MCHC RBC AUTO-ENTMCNC: 32.5 G/DL (ref 31.4–37.4)
MCV RBC AUTO: 95 FL (ref 82–98)
MONOCYTES # BLD AUTO: 1.08 THOUSAND/ÂΜL (ref 0.17–1.22)
MONOCYTES NFR BLD AUTO: 9 % (ref 4–12)
NEUTROPHILS # BLD AUTO: 9.54 THOUSANDS/ÂΜL (ref 1.85–7.62)
NEUTS SEG NFR BLD AUTO: 81 % (ref 43–75)
NRBC BLD AUTO-RTO: 0 /100 WBCS
PLATELET # BLD AUTO: 188 THOUSANDS/UL (ref 149–390)
PMV BLD AUTO: 10.4 FL (ref 8.9–12.7)
RBC # BLD AUTO: 4.79 MILLION/UL (ref 3.88–5.62)
WBC # BLD AUTO: 11.84 THOUSAND/UL (ref 4.31–10.16)

## 2022-10-28 ENCOUNTER — APPOINTMENT (EMERGENCY)
Dept: CT IMAGING | Facility: HOSPITAL | Age: 87
End: 2022-10-28

## 2022-10-28 PROBLEM — I10 PRIMARY HYPERTENSION: Status: ACTIVE | Noted: 2022-10-28

## 2022-10-28 PROBLEM — K52.9 ACUTE COLITIS: Status: ACTIVE | Noted: 2022-10-28

## 2022-10-28 PROBLEM — R41.0 ACUTE DELIRIUM: Status: ACTIVE | Noted: 2022-10-28

## 2022-10-28 PROBLEM — A41.9 SEPSIS (HCC): Status: ACTIVE | Noted: 2022-10-28

## 2022-10-28 PROBLEM — E87.6 HYPOKALEMIA: Status: ACTIVE | Noted: 2022-10-28

## 2022-10-28 PROBLEM — E87.20 LACTIC ACIDOSIS: Status: ACTIVE | Noted: 2022-10-28

## 2022-10-28 PROBLEM — N17.9 ACUTE KIDNEY INJURY (HCC): Status: ACTIVE | Noted: 2022-10-28

## 2022-10-28 PROBLEM — F03.C11: Status: ACTIVE | Noted: 2022-10-28

## 2022-10-28 LAB
4HR DELTA HS TROPONIN: 30 NG/L
ALBUMIN SERPL BCP-MCNC: 3.2 G/DL (ref 3.5–5)
ALP SERPL-CCNC: 61 U/L (ref 46–116)
ALT SERPL W P-5'-P-CCNC: 17 U/L (ref 12–78)
ANION GAP SERPL CALCULATED.3IONS-SCNC: 10 MMOL/L (ref 4–13)
ANION GAP SERPL CALCULATED.3IONS-SCNC: 13 MMOL/L (ref 4–13)
AST SERPL W P-5'-P-CCNC: 22 U/L (ref 5–45)
ATRIAL RATE: 79 BPM
BACTERIA UR QL AUTO: ABNORMAL /HPF
BILIRUB SERPL-MCNC: 0.34 MG/DL (ref 0.2–1)
BILIRUB UR QL STRIP: NEGATIVE
BUN SERPL-MCNC: 17 MG/DL (ref 5–25)
BUN SERPL-MCNC: 22 MG/DL (ref 5–25)
CALCIUM ALBUM COR SERPL-MCNC: 9.5 MG/DL (ref 8.3–10.1)
CALCIUM SERPL-MCNC: 8.3 MG/DL (ref 8.3–10.1)
CALCIUM SERPL-MCNC: 8.9 MG/DL (ref 8.3–10.1)
CARDIAC TROPONIN I PNL SERPL HS: 40 NG/L
CHLORIDE SERPL-SCNC: 107 MMOL/L (ref 96–108)
CHLORIDE SERPL-SCNC: 109 MMOL/L (ref 96–108)
CK MB SERPL-MCNC: 2.7 NG/ML (ref 0–5)
CK MB SERPL-MCNC: <1 % (ref 0–2.5)
CK SERPL-CCNC: 294 U/L (ref 39–308)
CLARITY UR: ABNORMAL
CO2 SERPL-SCNC: 22 MMOL/L (ref 21–32)
CO2 SERPL-SCNC: 23 MMOL/L (ref 21–32)
COLOR UR: YELLOW
CREAT SERPL-MCNC: 1.06 MG/DL (ref 0.6–1.3)
CREAT SERPL-MCNC: 1.39 MG/DL (ref 0.6–1.3)
ERYTHROCYTE [DISTWIDTH] IN BLOOD BY AUTOMATED COUNT: 13.2 % (ref 11.6–15.1)
GFR SERPL CREATININE-BSD FRML MDRD: 44 ML/MIN/1.73SQ M
GFR SERPL CREATININE-BSD FRML MDRD: 62 ML/MIN/1.73SQ M
GLUCOSE SERPL-MCNC: 125 MG/DL (ref 65–140)
GLUCOSE SERPL-MCNC: 96 MG/DL (ref 65–140)
GLUCOSE UR STRIP-MCNC: NEGATIVE MG/DL
HCT VFR BLD AUTO: 38.2 % (ref 36.5–49.3)
HGB BLD-MCNC: 12.9 G/DL (ref 12–17)
HGB UR QL STRIP.AUTO: ABNORMAL
INR PPP: 1.11 (ref 0.84–1.19)
KETONES UR STRIP-MCNC: ABNORMAL MG/DL
LACTATE SERPL-SCNC: 2 MMOL/L (ref 0.5–2)
LACTATE SERPL-SCNC: 6.1 MMOL/L (ref 0.5–2)
LEUKOCYTE ESTERASE UR QL STRIP: NEGATIVE
MCH RBC QN AUTO: 31 PG (ref 26.8–34.3)
MCHC RBC AUTO-ENTMCNC: 33.8 G/DL (ref 31.4–37.4)
MCV RBC AUTO: 92 FL (ref 82–98)
NITRITE UR QL STRIP: NEGATIVE
NON-SQ EPI CELLS URNS QL MICRO: ABNORMAL /HPF
P AXIS: 54 DEGREES
PH UR STRIP.AUTO: 6.5 [PH]
PLATELET # BLD AUTO: 176 THOUSANDS/UL (ref 149–390)
PMV BLD AUTO: 10.4 FL (ref 8.9–12.7)
POTASSIUM SERPL-SCNC: 3.3 MMOL/L (ref 3.5–5.3)
POTASSIUM SERPL-SCNC: 4.3 MMOL/L (ref 3.5–5.3)
PR INTERVAL: 150 MS
PROT SERPL-MCNC: 6.9 G/DL (ref 6.4–8.4)
PROT UR STRIP-MCNC: ABNORMAL MG/DL
PROTHROMBIN TIME: 14.6 SECONDS (ref 11.6–14.5)
QRS AXIS: 76 DEGREES
QRSD INTERVAL: 82 MS
QT INTERVAL: 364 MS
QTC INTERVAL: 417 MS
RBC # BLD AUTO: 4.16 MILLION/UL (ref 3.88–5.62)
RBC #/AREA URNS AUTO: ABNORMAL /HPF
SODIUM SERPL-SCNC: 141 MMOL/L (ref 135–147)
SODIUM SERPL-SCNC: 143 MMOL/L (ref 135–147)
SP GR UR STRIP.AUTO: 1.02 (ref 1–1.03)
T WAVE AXIS: 44 DEGREES
UROBILINOGEN UR QL STRIP.AUTO: 0.2 E.U./DL
VALPROATE SERPL-MCNC: 12 UG/ML (ref 50–100)
VENTRICULAR RATE: 79 BPM
WBC # BLD AUTO: 10.18 THOUSAND/UL (ref 4.31–10.16)
WBC #/AREA URNS AUTO: ABNORMAL /HPF

## 2022-10-28 RX ORDER — METRONIDAZOLE 500 MG/1
500 TABLET ORAL ONCE
Status: COMPLETED | OUTPATIENT
Start: 2022-10-28 | End: 2022-10-28

## 2022-10-28 RX ORDER — CEFTRIAXONE 2 G/50ML
2000 INJECTION, SOLUTION INTRAVENOUS ONCE
Status: COMPLETED | OUTPATIENT
Start: 2022-10-28 | End: 2022-10-28

## 2022-10-28 RX ORDER — METRONIDAZOLE 500 MG/100ML
500 INJECTION, SOLUTION INTRAVENOUS EVERY 8 HOURS
Status: DISCONTINUED | OUTPATIENT
Start: 2022-10-28 | End: 2022-11-04 | Stop reason: HOSPADM

## 2022-10-28 RX ORDER — DIVALPROEX SODIUM 125 MG/1
125 CAPSULE, COATED PELLETS ORAL EVERY 12 HOURS SCHEDULED
Status: DISCONTINUED | OUTPATIENT
Start: 2022-10-28 | End: 2022-11-04 | Stop reason: HOSPADM

## 2022-10-28 RX ORDER — POTASSIUM CHLORIDE 20 MEQ/1
40 TABLET, EXTENDED RELEASE ORAL ONCE
Status: COMPLETED | OUTPATIENT
Start: 2022-10-28 | End: 2022-10-28

## 2022-10-28 RX ORDER — OMEPRAZOLE 40 MG/1
40 CAPSULE, DELAYED RELEASE ORAL
COMMUNITY
End: 2022-11-04 | Stop reason: CLARIF

## 2022-10-28 RX ORDER — PANTOPRAZOLE SODIUM 40 MG/10ML
40 INJECTION, POWDER, LYOPHILIZED, FOR SOLUTION INTRAVENOUS DAILY
Status: DISCONTINUED | OUTPATIENT
Start: 2022-10-28 | End: 2022-10-28

## 2022-10-28 RX ORDER — AMLODIPINE BESYLATE 2.5 MG/1
2.5 TABLET ORAL DAILY
Status: DISCONTINUED | OUTPATIENT
Start: 2022-10-28 | End: 2022-11-04 | Stop reason: HOSPADM

## 2022-10-28 RX ORDER — QUETIAPINE FUMARATE 25 MG/1
25 TABLET, FILM COATED ORAL
COMMUNITY
Start: 2022-11-04

## 2022-10-28 RX ORDER — ONDANSETRON 2 MG/ML
4 INJECTION INTRAMUSCULAR; INTRAVENOUS EVERY 6 HOURS PRN
Status: DISCONTINUED | OUTPATIENT
Start: 2022-10-28 | End: 2022-11-04 | Stop reason: HOSPADM

## 2022-10-28 RX ORDER — ACETAMINOPHEN 325 MG/1
650 TABLET ORAL EVERY 6 HOURS PRN
Status: DISCONTINUED | OUTPATIENT
Start: 2022-10-28 | End: 2022-11-04 | Stop reason: HOSPADM

## 2022-10-28 RX ORDER — HALOPERIDOL 5 MG/ML
5 INJECTION INTRAMUSCULAR ONCE
Status: COMPLETED | OUTPATIENT
Start: 2022-10-28 | End: 2022-10-28

## 2022-10-28 RX ORDER — MEMANTINE HYDROCHLORIDE 5 MG/1
5 TABLET ORAL DAILY
COMMUNITY
Start: 2022-11-04

## 2022-10-28 RX ORDER — SODIUM CHLORIDE 9 MG/ML
100 INJECTION, SOLUTION INTRAVENOUS CONTINUOUS
Status: DISCONTINUED | OUTPATIENT
Start: 2022-10-28 | End: 2022-10-29

## 2022-10-28 RX ORDER — DIVALPROEX SODIUM 125 MG/1
125 CAPSULE, COATED PELLETS ORAL 2 TIMES DAILY
COMMUNITY
Start: 2022-11-04

## 2022-10-28 RX ORDER — PRAVASTATIN SODIUM 40 MG
40 TABLET ORAL
Status: DISCONTINUED | OUTPATIENT
Start: 2022-10-28 | End: 2022-11-04 | Stop reason: HOSPADM

## 2022-10-28 RX ORDER — QUETIAPINE FUMARATE 25 MG/1
25 TABLET, FILM COATED ORAL
Status: DISCONTINUED | OUTPATIENT
Start: 2022-10-28 | End: 2022-11-04 | Stop reason: HOSPADM

## 2022-10-28 RX ORDER — TRAZODONE HYDROCHLORIDE 50 MG/1
50 TABLET ORAL
Status: DISCONTINUED | OUTPATIENT
Start: 2022-10-28 | End: 2022-11-04 | Stop reason: HOSPADM

## 2022-10-28 RX ORDER — MEMANTINE HYDROCHLORIDE 5 MG/1
5 TABLET ORAL DAILY
Status: DISCONTINUED | OUTPATIENT
Start: 2022-10-28 | End: 2022-11-04 | Stop reason: HOSPADM

## 2022-10-28 RX ORDER — ASPIRIN 81 MG/1
81 TABLET, CHEWABLE ORAL DAILY
COMMUNITY
Start: 2022-11-04

## 2022-10-28 RX ORDER — PANTOPRAZOLE SODIUM 40 MG/1
40 TABLET, DELAYED RELEASE ORAL DAILY
Status: DISCONTINUED | OUTPATIENT
Start: 2022-10-28 | End: 2022-11-04 | Stop reason: HOSPADM

## 2022-10-28 RX ORDER — TRAZODONE HYDROCHLORIDE 50 MG/1
50 TABLET ORAL
COMMUNITY
Start: 2022-11-04

## 2022-10-28 RX ORDER — PRAVASTATIN SODIUM 80 MG/1
40 TABLET ORAL
Status: ON HOLD | COMMUNITY
End: 2022-10-28

## 2022-10-28 RX ORDER — IRBESARTAN 300 MG/1
300 TABLET ORAL
COMMUNITY
Start: 2022-11-04

## 2022-10-28 RX ORDER — AMLODIPINE BESYLATE 2.5 MG/1
2.5 TABLET ORAL DAILY
COMMUNITY
Start: 2022-11-04

## 2022-10-28 RX ORDER — QUETIAPINE FUMARATE 50 MG/1
50 TABLET, FILM COATED ORAL
COMMUNITY
Start: 2022-11-04

## 2022-10-28 RX ORDER — LABETALOL HYDROCHLORIDE 5 MG/ML
10 INJECTION, SOLUTION INTRAVENOUS EVERY 6 HOURS PRN
Status: DISCONTINUED | OUTPATIENT
Start: 2022-10-28 | End: 2022-11-04 | Stop reason: HOSPADM

## 2022-10-28 RX ORDER — DONEPEZIL HYDROCHLORIDE 10 MG/1
10 TABLET, FILM COATED ORAL
COMMUNITY
End: 2022-11-04 | Stop reason: ALTCHOICE

## 2022-10-28 RX ORDER — HEPARIN SODIUM 5000 [USP'U]/ML
5000 INJECTION, SOLUTION INTRAVENOUS; SUBCUTANEOUS EVERY 12 HOURS SCHEDULED
Status: DISCONTINUED | OUTPATIENT
Start: 2022-10-28 | End: 2022-11-04 | Stop reason: HOSPADM

## 2022-10-28 RX ORDER — QUETIAPINE FUMARATE 25 MG/1
50 TABLET, FILM COATED ORAL
Status: DISCONTINUED | OUTPATIENT
Start: 2022-10-28 | End: 2022-11-02

## 2022-10-28 RX ORDER — DONEPEZIL HYDROCHLORIDE 5 MG/1
10 TABLET, FILM COATED ORAL
Status: DISCONTINUED | OUTPATIENT
Start: 2022-10-28 | End: 2022-11-04 | Stop reason: HOSPADM

## 2022-10-28 RX ORDER — CEFTRIAXONE 1 G/50ML
1000 INJECTION, SOLUTION INTRAVENOUS EVERY 24 HOURS
Status: DISCONTINUED | OUTPATIENT
Start: 2022-10-29 | End: 2022-10-30

## 2022-10-28 RX ORDER — PRAVASTATIN SODIUM 40 MG
40 TABLET ORAL
COMMUNITY
End: 2022-11-04 | Stop reason: ALTCHOICE

## 2022-10-28 RX ADMIN — HALOPERIDOL LACTATE 5 MG: 5 INJECTION, SOLUTION INTRAMUSCULAR at 01:28

## 2022-10-28 RX ADMIN — SODIUM CHLORIDE 100 ML/HR: 0.9 INJECTION, SOLUTION INTRAVENOUS at 04:38

## 2022-10-28 RX ADMIN — METRONIDAZOLE 500 MG: 5 INJECTION, SOLUTION INTRAVENOUS at 18:12

## 2022-10-28 RX ADMIN — HEPARIN SODIUM 5000 UNITS: 5000 INJECTION INTRAVENOUS; SUBCUTANEOUS at 08:09

## 2022-10-28 RX ADMIN — MEMANTINE 5 MG: 5 TABLET ORAL at 08:09

## 2022-10-28 RX ADMIN — IOHEXOL 100 ML: 350 INJECTION, SOLUTION INTRAVENOUS at 00:32

## 2022-10-28 RX ADMIN — ACETAMINOPHEN 650 MG: 325 TABLET ORAL at 21:09

## 2022-10-28 RX ADMIN — CEFTRIAXONE 2000 MG: 2 INJECTION, SOLUTION INTRAVENOUS at 01:40

## 2022-10-28 RX ADMIN — SODIUM CHLORIDE, SODIUM LACTATE, POTASSIUM CHLORIDE, AND CALCIUM CHLORIDE 1000 ML: .6; .31; .03; .02 INJECTION, SOLUTION INTRAVENOUS at 02:22

## 2022-10-28 RX ADMIN — DIVALPROEX SODIUM 125 MG: 125 CAPSULE, COATED PELLETS ORAL at 21:09

## 2022-10-28 RX ADMIN — AMLODIPINE BESYLATE 2.5 MG: 2.5 TABLET ORAL at 17:16

## 2022-10-28 RX ADMIN — PANTOPRAZOLE SODIUM 40 MG: 40 INJECTION, POWDER, FOR SOLUTION INTRAVENOUS at 08:52

## 2022-10-28 RX ADMIN — PRAVASTATIN SODIUM 40 MG: 40 TABLET ORAL at 21:09

## 2022-10-28 RX ADMIN — LABETALOL HYDROCHLORIDE 10 MG: 5 INJECTION, SOLUTION INTRAVENOUS at 21:09

## 2022-10-28 RX ADMIN — DONEPEZIL HYDROCHLORIDE 10 MG: 5 TABLET ORAL at 21:09

## 2022-10-28 RX ADMIN — TRAZODONE HYDROCHLORIDE 50 MG: 50 TABLET ORAL at 21:09

## 2022-10-28 RX ADMIN — POTASSIUM CHLORIDE 40 MEQ: 1500 TABLET, EXTENDED RELEASE ORAL at 14:18

## 2022-10-28 RX ADMIN — METRONIDAZOLE 500 MG: 5 INJECTION, SOLUTION INTRAVENOUS at 11:47

## 2022-10-28 RX ADMIN — PANTOPRAZOLE SODIUM 40 MG: 40 TABLET, DELAYED RELEASE ORAL at 14:18

## 2022-10-28 RX ADMIN — QUETIAPINE FUMARATE 50 MG: 25 TABLET ORAL at 21:09

## 2022-10-28 RX ADMIN — HEPARIN SODIUM 5000 UNITS: 5000 INJECTION INTRAVENOUS; SUBCUTANEOUS at 21:09

## 2022-10-28 RX ADMIN — SODIUM CHLORIDE 1000 ML: 0.9 INJECTION, SOLUTION INTRAVENOUS at 00:09

## 2022-10-28 RX ADMIN — METRONIDAZOLE 500 MG: 500 TABLET ORAL at 01:40

## 2022-10-28 NOTE — ASSESSMENT & PLAN NOTE
· CT abd/pelvis showed acute colitis as likely infectious source  · On ceftriaxone D#1/5 plus metronidazole D#1/4  · Monitor vitals  · Daily CBC  · Acute care surgery and GI consulted, will appreciate recs  · NPO today in case either intends to perform interventions  · Continue maintenance fluids

## 2022-10-28 NOTE — ASSESSMENT & PLAN NOTE
· H&P listed sepsis as one of the primary diagnoses, which was reasonable to presume at the time given colitis and delirium   Now it appears SIRS criteria have yet to manifest themselves, so sepsis has been ruled out of the differential   · CT abd/pelvis showed acute colitis as likely infectious source  · On ceftriaxone D#1/5 plus metronidazole D#1/4  · Monitor vitals  · Daily CBC, WBC mildly elevated and downtrending  · Acute care surgery and GI consulted, will appreciate recs  · Diet: surgical diet with clear liquid and toast  · Because on PO diet, will switch IV PPI to PO  · Continue maintenance fluids

## 2022-10-28 NOTE — CONSULTS
Consultation - [facility]  Yaseminbritt Lanre 80 y o  male MRN: [id number]  Unit/Bed#: 741-54 Encounter: [id number]        Consults    Reason for Consult / Principal Problem:     1  Colitis      ASSESSMENT AND PLAN:      1  Colitis     Patient presented to the emergency department from [facility] facility being found unresponsive  He does have a history of dementia and review of systems is limited  CT scan revealed wall thickening of the cecum and ascending colon suspicious for infectious or inflammatory colitis  Hemoglobin on admission 14 8  He was reported to have a low blood pressure prior to admission  Findings could be related to ischemia given his low blood pressure however would expect to see rectal bleeding and abdominal pain  Findings could also be related to infectious or inflammatory etiology  Will order stool studies and inflammatory stool marker  If any symptoms occur, could consider colonoscopy  - follow-up stool studies  - continue PPI  - monitor stool color and consistency  - diet as tolerated  - follow hemoglobin    ______________________________________________________________________    HPI:  Jennifer Ho is an 54-year-old male with a past history dementia, hyperlipidemia, hypertension GERD that presented to the emergency department from [facility] facility being found unresponsive  CT of the abdomen revealed moderate circumferential wall thickening involving the cecum and ascending colon suspicious for infectious inflammatory colitis and several scattered hepatic hypodensities likely representing cysts  Hemoglobin on admission 14 8 and LFTs normal   Review of systems limited however patient denies any abdominal pain and is nontender on exam   He denies any black or bloody stools  Denies any nausea, vomiting or heartburn  REVIEW OF SYSTEMS:    Limited due to dementia        Historical Information   Past Medical History:   Diagnosis Date   • Hypertension History reviewed  No pertinent surgical history  Social History   Social History     Substance and Sexual Activity   Alcohol Use Never     Social History     Substance and Sexual Activity   Drug Use Never     Social History     Tobacco Use   Smoking Status Never Smoker   Smokeless Tobacco Never Used     History reviewed  No pertinent family history      Meds/Allergies     Medications Prior to Admission   Medication   • amLODIPine (NORVASC) 2 5 mg tablet   • aspirin 81 mg chewable tablet   • divalproex sodium (DEPAKOTE SPRINKLE) 125 MG capsule   • donepezil (ARICEPT) 10 mg tablet   • irbesartan (AVAPRO) 300 mg tablet   • memantine (NAMENDA) 5 mg tablet   • omeprazole (PriLOSEC) 40 MG capsule   • pravastatin (PRAVACHOL) 40 mg tablet   • QUEtiapine (SEROquel) 25 mg tablet   • QUEtiapine (SEROquel) 50 mg tablet   • traZODone (DESYREL) 50 mg tablet     Current Facility-Administered Medications   Medication Dose Route Frequency   • acetaminophen (TYLENOL) tablet 650 mg  650 mg Oral Q6H PRN   • [START ON 10/29/2022] cefTRIAXone (ROCEPHIN) IVPB (premix in dextrose) 1,000 mg 50 mL  1,000 mg Intravenous Q24H   • donepezil (ARICEPT) tablet 10 mg  10 mg Oral HS   • heparin (porcine) subcutaneous injection 5,000 Units  5,000 Units Subcutaneous Q12H Albrechtras 62   • iohexol (OMNIPAQUE) 350 MG/ML injection (SINGLE-DOSE) 100 mL  100 mL Intravenous Once in imaging   • memantine (NAMENDA) tablet 5 mg  5 mg Oral Daily   • metroNIDAZOLE (FLAGYL) IVPB (premix) 500 mg 100 mL  500 mg Intravenous Q8H   • ondansetron (ZOFRAN) injection 4 mg  4 mg Intravenous Q6H PRN   • pantoprazole (PROTONIX) injection 40 mg  40 mg Intravenous Daily   • pravastatin (PRAVACHOL) tablet 40 mg  40 mg Oral HS   • QUEtiapine (SEROquel) tablet 25 mg  25 mg Oral Early Morning   • QUEtiapine (SEROquel) tablet 50 mg  50 mg Oral HS   • sodium chloride 0 9 % infusion  100 mL/hr Intravenous Continuous   • traZODone (DESYREL) tablet 50 mg  50 mg Oral HS       No Known Allergies        Objective     Blood pressure (!) 155/102, pulse 69, temperature 98 °F (36 7 °C), resp  rate 20, height 5' 10" (1 778 m), weight 75 4 kg (166 lb 3 6 oz), SpO2 98 %  Body mass index is 23 85 kg/m²  Intake/Output Summary (Last 24 hours) at 10/28/2022 1024  Last data filed at 10/28/2022 0816  Gross per 24 hour   Intake 10 ml   Output 400 ml   Net -390 ml         PHYSICAL EXAM:      General Appearance:   Alert, cooperative, no distress   HEENT:   Normocephalic, atraumatic, anicteric      Neck:  Supple, symmetrical, trachea midline   Lungs:   Clear to auscultation bilaterally; no rales, rhonchi or wheezing; respirations unlabored    Heart[de-identified]   Regular rate and rhythm; no murmur, rub, or gallop     Abdomen:   Soft, non-tender, non-distended; normal bowel sounds; no masses, no organomegaly    Genitalia:   Deferred    Rectal:   Deferred    Extremities:  No cyanosis, clubbing or edema    Pulses:  2+ and symmetric all extremities    Skin:  No jaundice or lesions             Lab Results:   Admission on 10/27/2022   Component Date Value   • WBC 10/27/2022 11 84 (A)   • RBC 10/27/2022 4 79    • Hemoglobin 10/27/2022 14 8    • Hematocrit 10/27/2022 45 5    • MCV 10/27/2022 95    • MCH 10/27/2022 30 9    • MCHC 10/27/2022 32 5    • RDW 10/27/2022 13 4    • MPV 10/27/2022 10 4    • Platelets 26/94/9366 188    • nRBC 10/27/2022 0    • Neutrophils Relative 10/27/2022 81 (A)   • Immat GRANS % 10/27/2022 0    • Lymphocytes Relative 10/27/2022 9 (A)   • Monocytes Relative 10/27/2022 9    • Eosinophils Relative 10/27/2022 1    • Basophils Relative 10/27/2022 0    • Neutrophils Absolute 10/27/2022 9 54 (A)   • Immature Grans Absolute 10/27/2022 0 05    • Lymphocytes Absolute 10/27/2022 1 01    • Monocytes Absolute 10/27/2022 1 08    • Eosinophils Absolute 10/27/2022 0 11    • Basophils Absolute 10/27/2022 0 05    • Sodium 10/27/2022 141    • Potassium 10/27/2022 4 3    • Chloride 10/27/2022 109 (A)   • CO2 10/27/2022 22 • ANION GAP 10/27/2022 10    • BUN 10/27/2022 22    • Creatinine 10/27/2022 1 39 (A)   • Glucose 10/27/2022 125    • Calcium 10/27/2022 8 9    • Corrected Calcium 10/27/2022 9 5    • AST 10/27/2022 22    • ALT 10/27/2022 17    • Alkaline Phosphatase 10/27/2022 61    • Total Protein 10/27/2022 6 9    • Albumin 10/27/2022 3 2 (A)   • Total Bilirubin 10/27/2022 0 34    • eGFR 10/27/2022 44    • hs TnI 0hr 10/27/2022 10    • Color, UA 10/28/2022 Yellow    • Clarity, UA 10/28/2022 Slightly Cloudy    • Specific Gravity, UA 10/28/2022 1 020    • pH, UA 10/28/2022 6 5    • Leukocytes, UA 10/28/2022 Negative    • Nitrite, UA 10/28/2022 Negative    • Protein, UA 10/28/2022 30 (1+) (A)   • Glucose, UA 10/28/2022 Negative    • Ketones, UA 10/28/2022 Trace (A)   • Urobilinogen, UA 10/28/2022 0 2    • Bilirubin, UA 10/28/2022 Negative    • Occult Blood, UA 10/28/2022 Large (A)   • Ventricular Rate 10/27/2022 79    • Atrial Rate 10/27/2022 79    • UT Interval 10/27/2022 150    • QRSD Interval 10/27/2022 82    • QT Interval 10/27/2022 364    • QTC Interval 10/27/2022 417    • P Axis 10/27/2022 54    • QRS Axis 10/27/2022 76    • T Wave Axis 10/27/2022 44    • hs TnI 4hr 10/28/2022 40    • Delta 4hr hsTnI 10/28/2022 30 (A)   • RBC, UA 10/28/2022 Innumerable (A)   • WBC, UA 10/28/2022 2-4    • Epithelial Cells 10/28/2022 Occasional    • Bacteria, UA 10/28/2022 Occasional    • LACTIC ACID 10/28/2022 6 1 (A)   • Blood Culture 10/28/2022 Received in Microbiology Lab  Culture in Progress  • Blood Culture 10/28/2022 Received in Microbiology Lab  Culture in Progress      • LACTIC ACID 10/28/2022 2 0    • Valproic Acid, Total 10/28/2022 12 (A)   • Sodium 10/28/2022 143    • Potassium 10/28/2022 3 3 (A)   • Chloride 10/28/2022 107    • CO2 10/28/2022 23    • ANION GAP 10/28/2022 13    • BUN 10/28/2022 17    • Creatinine 10/28/2022 1 06    • Glucose 10/28/2022 96    • Calcium 10/28/2022 8 3    • eGFR 10/28/2022 62    • WBC 10/28/2022 10 18 (A)   • RBC 10/28/2022 4 16    • Hemoglobin 10/28/2022 12 9    • Hematocrit 10/28/2022 38 2    • MCV 10/28/2022 92    • MCH 10/28/2022 31 0    • MCHC 10/28/2022 33 8    • RDW 10/28/2022 13 2    • Platelets 52/74/3375 176    • MPV 10/28/2022 10 4    • Total CK 10/28/2022 294    • Protime 10/28/2022 14 6 (A)   • INR 10/28/2022 1 11    • CK-MB Index 10/28/2022 <1 0    • CK-MB 10/28/2022 2 7        Imaging Studies: I have personally reviewed pertinent imaging studies

## 2022-10-28 NOTE — CASE MANAGEMENT
Case Management Progress Note    Patient name Pedro Pizano  Location /083-74 MRN [id number]  : 1933 Date 10/28/2022       LOS (days): 0  Geometric Mean LOS (GMLOS) (days): 2 60  Days to GMLOS:2 2        OBJECTIVE:        Current admission status: Inpatient  Preferred Pharmacy:   [address] [place] [address] #[id number] Juanis Bell [address] [facility] [address] [address] [place] [address] [address] [facility] [id number]  Phone: [phone] Fax: [phone]    Primary Care Provider: Kate Sandoval DO    Primary Insurance: MEDICARE  Secondary Insurance: [facility]    PROGRESS NOTE: call placed to [facility] to obtain information about pt, all staff busy will return call later today

## 2022-10-28 NOTE — RESTRAINT FACE TO FACE
Restraint Face to Face   Hector De Dios 80 y o  male MRN: [id number]  Unit/Bed#: RM02 Encounter: [id number]      Physical Evaluation: Dementia, agitated  Purpose for Restraints/ Seclusion High risk for self harm and High risk for harm to others  Patient's reaction to the intervention: Agitated  Patient's medical condition: Dementia, colitis, CESAR  Patient's Behavioral condition: Agitation  Restraints to be Continued
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
## 2022-10-28 NOTE — CASE MANAGEMENT
Case Management Assessment    Patient name Parker Client  Location /801-20 MRN [id number]  : 1933 Date 10/28/2022       Current Admission Date: 10/27/2022  Current Admission Diagnosis:Acute colitis   Patient Active Problem List    Diagnosis Date Noted   • Acute colitis 10/28/2022   • Lactic acidosis 10/28/2022   • Acute delirium 10/28/2022   • Severe dementia with agitation 10/28/2022   • Acute kidney injury ([facility] [address] ) 10/28/2022   • Hypokalemia 10/28/2022   • Primary hypertension 10/28/2022      LOS (days): 0  Geometric Mean LOS (GMLOS) (days): 2 60  Days to GMLOS:2     OBJECTIVE:    Risk of Unplanned Readmission Score: 14 99         Current admission status: Inpatient       Preferred Pharmacy:   [facility], [address] [address] [address]  Phone: [phone] Fax: [phone]    Primary Care Provider: Esther Plummer DO    Primary Insurance: MEDICARE  Secondary Insurance: [facility]    ASSESSMENT:  Lennie [address] [employer]     Marlon [address], [address] [facility] Representative   Primary Phone: [phone] [facility])  Work Phone: [phone]               Advance Directives  Does patient have a [address]?:  (waiting on call back from [facility])  Does patient have Advance Directives? :  (waiting on call back from [facility])  Primary Contact: Bo Alvarado         Readmission Root Cause  30 Day Readmission: No    Patient Information  Admitted from[de-identified] Facility ([facility])  Mental Status: Alert  During Assessment patient was accompanied by: Not accompanied during assessment  Assessment information provided by[de-identified] Other - please comment Melvin Gao at [place])  Primary Caregiver:  Other (Comment)  Caregiver's Name[de-identified] [facility]  Caregiver's Relationship to Patient[de-identified] Other (Specify)  Caregiver's Telephone Number[de-identified] [phone]  [facility] [address] do you live in?: [place]  Home entry access options   Select all that apply : No steps to enter home  Type of Current Residence: Facility  Upon entering residence, is there a bedroom on the main floor (no further steps)?: Yes  Upon entering residence, is there a bathroom on the main floor (no further steps)?: Yes  In the last 12 months, was there a time when you were not able to pay the mortgage or rent on time?: No  In the last 12 months, how many places have you lived?: 1  In the last 12 months, was there a time when you did not have a steady place to sleep or slept in a shelter (including now)?: No  Homeless/housing insecurity resource given?: N/A  Living Arrangements: Other (Comment) (lives in facility)  Is patient a ?:  (waiting on call back from daughter)    Activities of Daily Living Prior to Admission  Completes ADLs independently?: No  Level of ADL dependence: Assistance  Ambulates independently?: Yes  Does patient use assisted devices?: No  Does patient currently own DME?: No  Does patient have a history of Outpatient Therapy (PT/OT)?: No  Does the patient have a history of Short-Term Rehab?: No  Does patient have a history of HHC?: No  Does patient currently have [address]?: No         Patient Information Continued  Income Source: Pension/USP  Does patient have prescription coverage?: Yes  Within the past 12 months, you worried that your food would run out before you got the money to buy more : Never true  Within the past 12 months, the food you bought just didn't last and you didn't have money to get more : Never true  Food insecurity resource given?: N/A  Does patient receive dialysis treatments?: No  Does patient have a history of substance abuse?: No  Does patient have a history of Mental Health Diagnosis?: No (per chart review)         Means of Transportation  [facility]  (medical transport)  In the past 12 months, has lack of transportation kept you from medical appointments or from getting medications?: No  In the past 12 months, has lack of transportation kept you from meetings, work, or from getting things needed for daily living?: No  Was application for public transport provided?: N/A  Patient confused, currently on virtual 1:1  [facility] and spoke with Lauren  She states pt resides in dementia unit and walks independently and does not use any assistive device  He can get agitated at times  Call placed to grandaughter, left message to call CM back  Cm to follow

## 2022-10-28 NOTE — H&P
History and Physical - [facility]    Patient Information: Clara Pederson 80 y o  male MRN: [id number]  Unit/Bed#: 058-34 Encounter: [id number]  Admitting Physician: Ruthy Harrell  PCP: Maxine Manuel DO  Date of Admission:  10/28/22    Assessment/Plan:    Hospital Problem List:     Principal Problem:    Sepsis [facility])  Active Problems:    Acute colitis    Lactic acidosis    Acute delirium    Severe dementia with agitation    Acute kidney injury ([facility] [address] )      Plan for the Primary Problem(s):  · Admit to Med/Surg  · NPO, IV fluids, PRN parenteral narcotic & antiemetic  · Continue IV Rocephin & flagyl  · Consults from GI & Gen Surg    Plan for Additional Problems:   · Resume usual psych meds kam atypical antipsychotic for management of acute delirium  · Monitor renal fxn & avoid nephrotic agents  · Immediate ICU transfer if hemodynamic instability or clinical deterioration  · Code status DNR per nursing home records    VTE Prophylaxis: Heparin  / sequential compression device   Code Status: DNR  POLST: POLST form is on file already (pre-hospital)    Anticipated Length of Stay:  Patient will be admitted on an Inpatient basis with an anticipated length of stay of  > 2 midnights  Justification for Hospital Stay: IV antibiotics    Total Time for Visit, including Counseling / Coordination of Care: 45 minutes  Greater than 50% of this total time spent on direct patient counseling and coordination of care  Chief Complaint:   Altered mental status    History of Present Illness:    Clara Pederson [facility] a 80 [facility] o  [facility]  His PMH is remarkable for advanced dementia with behavioral disturbances  Patient as such is unable to provide any pertinent history   He was transported tonight from his nursing home abode to this facility's ED due to transient unresponsiveness with generalized weakness & worsened confusion, as well as BP lower than usual  Patient reportedly had an unwitnessed fall at the nursing home earlier in the day  His vitals upon ED arrival incl /94 with temp 36 2C  Noncontrast head CT was neg for acute intracranial abnormality  CT abd/pelvis with IV contrast was remarkable for infectious vs inflammatory cecum & ascending colitis( nonemergent colonoscopy recommended to exclude neoplasm ), with no evidence of large or small bowel obstruction  Due to agitation while kicking & swinging at nursing staff, patient was placed in 4 point restraints at the ED & given haldol 5mg IM dose  He was given 1L NS IV bolus as well as IV Rocephin & flagyl prior to being admitted to the hospitalist service for further management of colitis  Due to continued patient agitation with violent behavior towards nursing staff while swing both arms & kicking both legs as witnessed by myself, 4 point restraint( due to risk of patient harm to self & others )was continued upon patient arrival to Med/Surg Floor  Review of Systems:    A 10+ point review of systems was obtained & is as above, otherwise negative  Review of Systems    Past Medical and Surgical History:     Past Medical History:   Diagnosis Date   • Hypertension        History reviewed  No pertinent surgical history  Meds/Allergies:    Prior to Admission medications    Medication Sig Start Date End Date Taking?  Authorizing Provider   amLODIPine (NORVASC) 2 5 mg tablet Take 2 5 mg by mouth daily   Yes Historical Provider, MD   aspirin 81 mg chewable tablet Chew 81 mg daily   Yes Historical Provider, MD   divalproex sodium (DEPAKOTE SPRINKLE) 125 MG capsule Take 125 mg by mouth 2 (two) times a day   Yes Historical Provider, MD   donepezil (ARICEPT) 10 mg tablet Take 10 mg by mouth daily at bedtime   Yes Historical Provider, MD   irbesartan (AVAPRO) 300 mg tablet Take 300 mg by mouth daily at bedtime   Yes Historical Provider, MD   memantine (NAMENDA) 5 mg tablet Take 5 mg by mouth daily   Yes Historical Provider, MD   omeprazole (PriLOSEC) 40 MG capsule Take 40 mg by mouth daily before breakfast   Yes Historical Provider, MD   pravastatin (PRAVACHOL) 40 mg tablet Take 40 mg by mouth daily at bedtime   Yes Historical Provider, MD   QUEtiapine (SEROquel) 25 mg tablet Take 25 mg by mouth daily in the early morning   Yes Historical Provider, MD   QUEtiapine (SEROquel) 50 mg tablet Take 50 mg by mouth daily at bedtime   Yes Historical Provider, MD   traZODone (DESYREL) 50 mg tablet Take 50 mg by mouth daily at bedtime   Yes Historical Provider, MD   pravastatin (PRAVACHOL) 80 mg tablet Take 40 mg by mouth daily at bedtime  10/28/22 Yes Historical Provider, MD     I have reveiwed home medications using records provided by [facility]      Allergies: No Known Allergies    Social History:     Marital Status: /Civil Union   Patient Pre-hospital Living Situation: nursing home  Patient Pre-hospital Level of Mobility: UTD  Substance Use History:   Social History     Substance and Sexual Activity   Alcohol Use Never     Social History     Tobacco Use   Smoking Status Never Smoker   Smokeless Tobacco Never Used     Social History     Substance and Sexual Activity   Drug Use Never       Family History:    non-contributory    Physical Exam:   General: thin built elderly gentleman, in no obvious distress  HEENT: oral mucosa dry, not pale or jaundiced  Neck: supple, no JVD  Resp: clear lungs, no crackles or wheeze  Cardiovascular: S1 S2 audible, regular  GI: soft nondistended abdomen, nontender, bowel sounds present  Musculoskeletal: no pedal edema or cyanosis  Skin: no petechiae or suspicious rash  Psych: oriented to person  Neuro: Awake, alert, verbally responsive but quite confused, moves all extremities equally, essentially nonfocal      Vitals:   Blood Pressure: 127/80 (10/28/22 0212)  Pulse: 84 (10/28/22 0218)  Temperature: (!) 97 2 °F (36 2 °C) (10/28/22 0212)  Temp Source: Tympanic (10/27/22 2248)  Respirations: 16 (10/27/22 2330)  Height: 5' 10" (177 8 cm) (10/28/22 2466)  Weight - Scale: 76 5 kg (168 lb 10 4 oz) (10/28/22 0212)  SpO2: 97 % (10/28/22 8571)      Additional Data:     Lab Results: I have personally reviewed pertinent reports  Results from last 7 days   Lab Units 10/27/22  2309   WBC Thousand/uL 11 84*   HEMOGLOBIN g/dL 14 8   HEMATOCRIT % 45 5   PLATELETS Thousands/uL 188   NEUTROS PCT % 81*   LYMPHS PCT % 9*   MONOS PCT % 9   EOS PCT % 1     Results from last 7 days   Lab Units 10/27/22  2309 10/26/22  0056   POTASSIUM mmol/L 4 1 3 5   CHLORIDE mmol/L 104 105   CO2 mmol/L 27 30   BUN mg/dL 22 20   CREATININE mg/dL 1 39* 1 07   CALCIUM mg/dL 9 1 8 6   ALK PHOS U/L 68 61   ALT U/L 14 15   AST U/L  --  10         Invalid input(s): TROIP    Imaging: I have personally reviewed pertinent reports  CT head without contrast    Result Date: 10/27/2022  Narrative: CT BRAIN - WITHOUT CONTRAST INDICATION:   Head trauma, minor (Age >= 65y) fall  COMPARISON:  CT brain dated July 3, 2021  TECHNIQUE:  CT examination of the brain was performed  In addition to axial images, sagittal and coronal 2D reformatted images were created and submitted for interpretation  Radiation dose length product (DLP) for this visit:  998 mGy-cm   This examination, like all CT scans performed in the [facility], was performed utilizing techniques to minimize radiation dose exposure, including the use of iterative reconstruction and automated exposure control  IMAGE QUALITY:  Diagnostic  FINDINGS: PARENCHYMA:  No intracranial mass, mass effect or midline shift  No CT signs of acute infarction  No acute parenchymal hemorrhage  There is mild periventricular white matter low attenuation which is nonspecific and most likely related to chronic small vessel ischemic changes  VENTRICLES AND EXTRA-AXIAL SPACES:  Ventricles and extra-axial CSF spaces are prominent commensurate with the degree of volume loss  No hydrocephalus  No acute extra-axial hemorrhage  VISUALIZED ORBITS AND PARANASAL SINUSES:  Unremarkable  CALVARIUM AND EXTRACRANIAL SOFT TISSUES:  There is partial opacification of the bilateral mastoid air cells  The calvarium is intact  Impression: No acute intracranial abnormality  Mild chronic small vessel ischemic changes and volume loss  Workstation performed: [id number]     CT spine cervical without contrast    Result Date: 10/27/2022  Narrative: CT CERVICAL SPINE - WITHOUT CONTRAST INDICATION:   Neck trauma, intoxicated or obtunded (Age >= 16y) fall  COMPARISON:  CT cervical spine dated July 3, 2021  TECHNIQUE:  CT examination of the cervical spine was performed without intravenous contrast   Contiguous axial images were obtained  Sagittal and coronal reconstructions were performed  Radiation dose length product (DLP) for this visit:  337 mGy-cm   This examination, like all CT scans performed in the [facility], was performed utilizing techniques to minimize radiation dose exposure, including the use of iterative reconstruction and automated exposure control  IMAGE QUALITY:  Diagnostic  FINDINGS: ALIGNMENT:  Normal alignment of the cervical spine  No subluxation  VERTEBRAL BODIES:  No acute fracture  DEGENERATIVE CHANGES:  Mild multilevel cervical degenerative changes are noted without critical central canal stenosis  PREVERTEBRAL AND PARASPINAL SOFT TISSUES:  Unremarkable  THORACIC INLET:  Normal      Impression: No cervical spine fracture or traumatic malalignment  Workstation performed: [id number]     CT abdomen pelvis with contrast    Result Date: 10/28/2022  Narrative: CT ABDOMEN AND PELVIS WITH IV CONTRAST INDICATION:   Abdominal pain, acute, nonlocalized generalized abdominal pain  COMPARISON:  None  TECHNIQUE:  CT examination of the abdomen and pelvis was performed  Axial, sagittal, and coronal 2D reformatted images were created from the source data and submitted for interpretation   Radiation dose length product (DLP) for this visit:  718 mGy-cm   This examination, like all CT scans performed in the [facility], was performed utilizing techniques to minimize radiation dose exposure, including the use of iterative reconstruction and automated exposure control  IV Contrast:  100 mL of iohexol (OMNIPAQUE) <See Chart>  of iohexol (OMNIPAQUE) Enteric Contrast:  Enteric contrast was not administered  FINDINGS: ABDOMEN LOWER CHEST:  Atelectatic changes are noted at the lung bases  LIVER/BILIARY TREE:  There are several scattered hepatic hypodensities measuring up to 15 mm likely representing cysts  No prior studies are available for comparison  There is no intrahepatic biliary ductal dilatation  GALLBLADDER:  No calcified gallstones  No pericholecystic inflammatory change  SPLEEN:  Unremarkable  PANCREAS:  Unremarkable  ADRENAL GLANDS:  There is a 16 mm soft tissue nodule arising from the lateral limb of the left adrenal gland  The right adrenal gland is unremarkable  KIDNEYS/URETERS:  No hydronephrosis  There is a thinly septated cyst at the left renal lower pole measuring 5 0 cm  A right renal midpole cyst measures 4 6 cm  STOMACH AND BOWEL:  There is a segment of moderate colonic wall thickening involving the cecum and ascending colon suspicious for an infectious or inflammatory colitis  There is no significant pericolonic inflammatory stranding  No evidence of large or  small bowel obstruction  APPENDIX:  No findings to suggest appendicitis  ABDOMINOPELVIC CAVITY:  No ascites  No pneumoperitoneum  No lymphadenopathy  VESSELS:  Atherosclerotic changes are present  No evidence of aneurysm  PELVIS REPRODUCTIVE ORGANS:  The prostate is enlarged  URINARY BLADDER:  Unremarkable  ABDOMINAL WALL/INGUINAL REGIONS:  Unremarkable  OSSEOUS STRUCTURES:  No acute fracture or destructive osseous lesion       Impression: Moderate circumferential wall thickening involving the cecum and ascending colon suspicious for an infectious or inflammatory colitis  A nonemergent colonoscopy is recommended to exclude a colonic neoplasm  No evidence of large or small bowel obstruction  No free air or free fluid  Prostatomegaly  Several scattered hepatic hypodensities likely representing cysts  No prior studies are available for comparison  Consider a nonemergent dedicated enhanced CT or MRI of the liver for further characterization  Workstation performed: [id number]         ** Please Note: Dragon 360 Dictation voice to text software may have been used in the creation of this document

## 2022-10-28 NOTE — ASSESSMENT & PLAN NOTE
· Creatinine 1 39->1 06 overnight, currently no CESAR  · Possibly secondary to hypovolemia, patient s/p 2L fluid bolus  · Continue maintenance fluids  · Avoid nephrotoxic agents

## 2022-10-28 NOTE — PLAN OF CARE
Problem: PAIN - ADULT  Goal: Verbalizes/displays adequate comfort level or baseline comfort level  Description: Interventions:  - Encourage patient to monitor pain and request assistance  - Assess pain using appropriate pain scale  - Administer analgesics based on type and severity of pain and evaluate response  - Implement non-pharmacological measures as appropriate and evaluate response  - Consider cultural and social influences on pain and pain management  - Notify physician/advanced practitioner if interventions unsuccessful or patient reports new pain  Outcome: Progressing     Problem: INFECTION - ADULT  Goal: Absence or prevention of progression during hospitalization  Description: INTERVENTIONS:  - Assess and monitor for signs and symptoms of infection  - Monitor lab/diagnostic results  - Monitor all insertion sites, i e  indwelling lines, tubes, and drains  - Monitor endotracheal if appropriate and nasal secretions for changes in amount and color  - Anderson appropriate cooling/warming therapies per order  - Administer medications as ordered  - Instruct and encourage patient and family to use good hand hygiene technique  - Identify and instruct in appropriate isolation precautions for identified infection/condition  Outcome: Progressing  Goal: Absence of fever/infection during neutropenic period  Description: INTERVENTIONS:  - Monitor WBC    Outcome: Progressing     Problem: SAFETY ADULT  Goal: Patient will remain free of falls  Description: INTERVENTIONS:  - Educate patient/family on patient safety including physical limitations  - Instruct patient to call for assistance with activity   - Consult OT/PT to assist with strengthening/mobility   - Keep Call bell within reach  - Keep bed low and locked with side rails adjusted as appropriate  - Keep care items and personal belongings within reach  - Initiate and maintain comfort rounds  - Make Fall Risk Sign visible to staff  - Offer Toileting every 2 Hours, in advance of need  - Initiate/Maintain bedalarm  - Obtain necessary fall risk management equipment: alarm/socks/restraint  - Apply yellow socks and bracelet for high fall risk patients  - Consider moving patient to room near nurses station  Outcome: Progressing  Goal: Maintain or return to baseline ADL function  Description: INTERVENTIONS:  -  Assess patient's ability to carry out ADLs; assess patient's baseline for ADL function and identify physical deficits which impact ability to perform ADLs (bathing, care of mouth/teeth, toileting, grooming, dressing, etc )  - Assess/evaluate cause of self-care deficits   - Assess range of motion  - Assess patient's mobility; develop plan if impaired  - Assess patient's need for assistive devices and provide as appropriate  - Encourage maximum independence but intervene and supervise when necessary  - Involve family in performance of ADLs  - Assess for home care needs following discharge   - Consider OT consult to assist with ADL evaluation and planning for discharge  - Provide patient education as appropriate  Outcome: Progressing  Goal: Maintains/Returns to pre admission functional level  Description: INTERVENTIONS:  - Perform BMAT or MOVE assessment daily    - Set and communicate daily mobility goal to care team and patient/family/caregiver  - Collaborate with rehabilitation services on mobility goals if consulted  - Perform Range of Motion 3 times a day  - Reposition patient every 2 hours    - Dangle patient 3 times a day  - Stand patient 3 times a day  - Ambulate patient 3 times a day  - Out of bed to chair 3 times a day   - Out of bed for meals 3 times a day  - Out of bed for toileting  - Record patient progress and toleration of activity level   Outcome: Progressing     Problem: DISCHARGE PLANNING  Goal: Discharge to home or other facility with appropriate resources  Description: INTERVENTIONS:  - Identify barriers to discharge w/patient and caregiver  - Arrange for needed discharge resources and transportation as appropriate  - Identify discharge learning needs (meds, wound care, etc )  - Arrange for interpretive services to assist at discharge as needed  - Refer to Case Management Department for coordinating discharge planning if the patient needs post-hospital services based on physician/advanced practitioner order or complex needs related to functional status, cognitive ability, or social support system  Outcome: Progressing     Problem: Knowledge Deficit  Goal: Patient/family/caregiver demonstrates understanding of disease process, treatment plan, medications, and discharge instructions  Description: Complete learning assessment and assess knowledge base    Interventions:  - Provide teaching at level of understanding  - Provide teaching via preferred learning methods  Outcome: Progressing     Problem: SAFETY,RESTRAINT: NV/NON-SELF DESTRUCTIVE BEHAVIOR  Goal: Remains free of harm/injury (restraint for non violent/non self-detsructive behavior)  Description: INTERVENTIONS:  - Instruct patient/family regarding restraint use   - Assess and monitor physiologic and psychological status   - Provide interventions and comfort measures to meet assessed patient needs   - Identify and implement measures to help patient regain control  - Assess readiness for release of restraint   Outcome: Progressing  Goal: Returns to optimal restraint-free functioning  Description: INTERVENTIONS:  - Assess the patient's behavior and symptoms that indicate continued need for restraint  - Identify and implement measures to help patient regain control  - Assess readiness for release of restraint   Outcome: Progressing

## 2022-10-28 NOTE — ASSESSMENT & PLAN NOTE
· H&P listed sepsis as one of the primary diagnoses, which was reasonable to presume at the time given colitis and delirium   Now it appears SIRS criteria have yet to manifest themselves, so sepsis has been ruled out of the differential   · CT abd/pelvis showed acute colitis as likely infectious source  · Continue ceftriaxone and Flagyl for at least an additional 24 hours  · Possible ischemic colitis versus infectious colitis present on admission    · Acute care surgery and GI consulted, will appreciate recs  · Diet: surgical diet with clear liquid and toast

## 2022-10-28 NOTE — CONSULTS
Consultation - General Surgery   Moon Flynn 80 y o  male MRN: [id number]  Unit/Bed#: 482-97 Encounter: [id number]    Assessment/Plan     Assessment:  Sepsis present on admission  Acute colitis without perforation  Lactic acidosis present on admission 6 1 now resolved 2 0 after fluid resuscitation  WBC last evening 11 84, repeated this morning 10 18  Hemoglobin stable 12 9  Patient afebrile    Metabolic encephalopathy, patient with severe dementia and agitation present on admission likely secondary to sepsis  Acute kidney injury present on admission 1 39, this morning repeated 1 06 normal  Hypokalemia, K 3 3  Essential hypertension, accelerated  GERD  Hyperlipidemia    Plan:  No plan for any general surgical intervention  Potassium supplementation  Repeat a m  labs  Await blood cultures x2  Continue IV Rocephin and metronidazole as scheduled  Advance clear liquids toast and crackers as tolerated  Serial abdominal exams  Hemoccult test stool  Urinary retention protocol  I&Os  Analgesics and antiemetics as ordered, would avoid use of narcotic pain medication  Dr Tamar Forrest also examine the patient at bedside here this morning  Continue to monitor  Gastroenterology consultation placed by Medicine, patient will need a non urgent colonoscopy  Dr Jose Ruiz is the general surgeon on-call over the weekend  The patient is a DNR/DNI code status  History of Present Illness     HPI:  Moon Flynn is a 80 y o  male resident of the [address] skilled nursing facility transported via ambulance to the hospital seen in the ED late last evening with an unwitnessed fall at the facility which occurred yesterday morning  Apparently the patient was found unresponsive by the staff there, the patient was unable to answer any questions, he does have a history of baseline dementia    The patient was brought to the hospital for evaluation of weakness, unsure of the delayed response in transport to the hospital as he was not seen until 10:37 p m  Jenifer Saha Unclear at the patient sustained any trauma  He is unable to provide any history  Details if he lost consciousness  He denies any abdominal pain  Was very shaky and became combative last evening  The patient had to be put in restraints and was unable to cooperate or answer questions  He underwent a CT scan abdomen pelvis with IV contrast which showed moderate circumferential wall thickening involving the cecum and ascending colon suspicious for infectious or inflammatory colitis  Non emergent colonoscopy was recommended to exclude a colonic neoplasm  No evidence of large or small bowel obstruction and no intraperitoneal air, he was noted to have prostatomegaly  Very difficult to obtain history in this patient  He is currently sitting up in bedside chair and under virtual surveillance because of a history of agitation, currently he is restraints are off  The patient was administered IM Haldol because of agitation early this morning  He is currently awake and able to recall his last name  He can not provide any details of actually why he is in the hospital   Right now he denies abdominal pain  General surgery consultation is requested at this time because of abnormal CT scan findings and also significant lactic acidosis present on admission of 6 2 which is now resolved to 2 0 this morning after IV fluid resuscitation  Patient is afebrile  He does have leukocytosis noted and was admitted with sepsis, blood cultures x2 were obtained and the patient has been started on IV Rocephin and Flagyl  There is no documented prior surgical procedures and the patient is unable to provide any details of his health history  Inpatient consult to Acute Care Surgery  Consult performed by: Carlin Nunes PA-C  Consult ordered by: Dayana Washington MD          Review of Systems   Unable to perform ROS: Dementia   Constitutional:        Patient is awake    Confusion and baseline dementia  Sitting up in bedside chair  He is under surveillance with video monitor  Provides very little history  The patient is unsure of actually why he is in the hospital   Overall appearance is thin, unkempt and malnourished  HENT: Positive for dental problem and hearing loss  Negative for trouble swallowing  Dry oral mucosa  Multiple nonrestorable carious and decayed teeth  Eyes: Negative  Respiratory: Negative for cough, shortness of breath and wheezing  Cardiovascular: Negative for chest pain, palpitations and leg swelling  Gastrointestinal: Positive for abdominal pain  He believes he last moved his bowels about 2 days ago, he is not frankly reliable to provide history  Endocrine: Negative  Genitourinary: Negative for difficulty urinating  Prostatomegaly noted on CT abdomen pelvis  Musculoskeletal: Positive for gait problem  Negative for back pain  Skin: Positive for pallor  Negative for rash and wound  Allergic/Immunologic: Negative  Neurological: Positive for weakness  Details regarding fall at the skilled nursing facility yesterday morning are not frankly obtainable from the patient  Historical perspective of delayed and actual timing of when to follow occur with response for ambulance transfer that he was evaluated here in the ED actually late last evening not clearly documented  Hematological: Negative  Psychiatric/Behavioral: Positive for agitation, behavioral problems and confusion  Historical Information   Past Medical History:   Diagnosis Date   • Hypertension      History reviewed  No pertinent surgical history    Social History   Social History     Substance and Sexual Activity   Alcohol Use Never     Social History     Substance and Sexual Activity   Drug Use Never     E-Cigarette/Vaping   • E-Cigarette Use Never User      E-Cigarette/Vaping Substances     Social History     Tobacco Use   Smoking Status Never Smoker Smokeless Tobacco Never Used     Family History: non-contributory    Meds/Allergies   current meds:   Current Facility-Administered Medications   Medication Dose Route Frequency   • acetaminophen (TYLENOL) tablet 650 mg  650 mg Oral Q6H PRN   • [START ON 10/29/2022] cefTRIAXone (ROCEPHIN) IVPB (premix in dextrose) 1,000 mg 50 mL  1,000 mg Intravenous Q24H   • donepezil (ARICEPT) tablet 10 mg  10 mg Oral HS   • heparin (porcine) subcutaneous injection 5,000 Units  5,000 Units Subcutaneous Q12H [address]   • iohexol (OMNIPAQUE) 350 MG/ML injection (SINGLE-DOSE) 100 mL  100 mL Intravenous Once in imaging   • memantine (NAMENDA) tablet 5 mg  5 mg Oral Daily   • metroNIDAZOLE (FLAGYL) IVPB (premix) 500 mg 100 mL  500 mg Intravenous Q8H   • ondansetron (ZOFRAN) injection 4 mg  4 mg Intravenous Q6H PRN   • pantoprazole (PROTONIX) injection 40 mg  40 mg Intravenous Daily   • pravastatin (PRAVACHOL) tablet 40 mg  40 mg Oral HS   • QUEtiapine (SEROquel) tablet 25 mg  25 mg Oral Early Morning   • QUEtiapine (SEROquel) tablet 50 mg  50 mg Oral HS   • sodium chloride 0 9 % infusion  100 mL/hr Intravenous Continuous   • traZODone (DESYREL) tablet 50 mg  50 mg Oral HS     No Known Allergies    Objective   First Vitals:   Blood Pressure: (!) 177/94 (10/27/22 2248)  Pulse: 79 (10/27/22 2248)  Temperature: (!) 97 2 °F (36 2 °C) (10/27/22 2248)  Temp Source: Tympanic (10/27/22 2248)  Respirations: 16 (10/27/22 2248)  Height: 5' 10" (177 8 cm) (10/28/22 0212)  Weight - Scale: 76 5 kg (168 lb 10 4 oz) (10/28/22 0212)  SpO2: 96 % (10/27/22 2248)    Current Vitals:   Blood Pressure: (!) 155/102 (10/28/22 0835)  Pulse: 69 (10/28/22 0835)  Temperature: 98 °F (36 7 °C) (10/28/22 0737)  Temp Source: Tympanic (10/27/22 9078)  Respirations: 20 (10/28/22 0716)  Height: 5' 10" (177 8 cm) (10/28/22 0655)  Weight - Scale: 75 4 kg (166 lb 3 6 oz) (10/28/22 0537)  SpO2: 98 % (10/28/22 0835)      Intake/Output Summary (Last 24 hours) at 10/28/2022 7919  Last data filed at 10/28/2022 0816  Gross per 24 hour   Intake 10 ml   Output 400 ml   Net -390 ml       Invasive Devices  Report    Peripheral Intravenous Line  Duration           Peripheral IV 10/28/22 Right Antecubital <1 day                Physical Exam     Thin, frail  Chronically ill-appearing  Unkempt appearance  Skin decreased turgor  Normocephalic, thin hair  Orbits appear sunken  Oral mucosa quite dry  Very poor natural dentition with multiple nonrestorable decayed teeth  Tongue midline  Neck no increased JVP  No carotid bruit  Trachea midline  Chest increased PA diameter  Heart regular rate and rhythm soft systolic murmur left sternal border  Lungs good inspiratory effort, clear to auscultation  Back no flank tenderness  Abdomen positive bowel sounds, scaphoid  Soft  No guarding or rebound  No masses  No inguinal hernias  Genitalia without any edema or scrotal swelling  Neurological exam shows fine tremor noted in the hands and feet  He is disoriented to place and time  He can not recall his last name  Ambulation could not be observed  The patient is on continuous virtual monitoring  Restraints currently off  Lab Results:   I have personally reviewed pertinent lab results  Lactic acid 2 Hours  Order: [id number] - Reflex for Order [id number]   Status: Final result     Visible to patient: No (inaccessible in [facility])     Next appt: None     0 Result Notes    Component Ref Range & Units 10/28/22 0524 10/28/22 0142   LACTIC ACID 0 5 - 2 0 mmol/L 2 0  6 1 High Panic               Narrative    Result may be elevated if tourniquet was used during collection        Specimen Collected: 10/28/22 05:24 Last Resulted: 10/28/22 06:04             CBC:   Lab Results   Component Value Date    WBC 10 18 (H) 10/28/2022    HGB 12 9 10/28/2022    HCT 38 2 10/28/2022    MCV 92 10/28/2022     10/28/2022    MCH 31 0 10/28/2022    MCHC 33 8 10/28/2022    RDW 13 2 10/28/2022 MPV 10 4 10/28/2022    NRBC 0 10/27/2022   , CMP:   Lab Results   Component Value Date    SODIUM 143 10/28/2022    K 3 3 (L) 10/28/2022     10/28/2022    CO2 23 10/28/2022    BUN 17 10/28/2022    CREATININE 1 06 10/28/2022    CALCIUM 8 3 10/28/2022    AST 22 10/27/2022    ALT 17 10/27/2022    ALKPHOS 61 10/27/2022    EGFR 62 10/28/2022   , Coagulation:   Lab Results   Component Value Date    INR 1 11 10/28/2022   , Urinalysis:   Lab Results   Component Value Date    COLORU Yellow 10/28/2022    CLARITYU Slightly Cloudy 10/28/2022    SPECGRAV 1 020 10/28/2022    PHUR 6 5 10/28/2022    LEUKOCYTESUR Negative 10/28/2022    NITRITE Negative 10/28/2022    GLUCOSEU Negative 10/28/2022    KETONESU Trace (A) 10/28/2022    BILIRUBINUR Negative 10/28/2022    BLOODU Large (A) 10/28/2022   , Lipase: No results found for: LIPASE  Imaging: I have personally reviewed pertinent films in PACS     CT ABDOMEN AND PELVIS WITH IV CONTRAST     INDICATION:   Abdominal pain, acute, nonlocalized  generalized abdominal pain      COMPARISON:  None      TECHNIQUE:  CT examination of the abdomen and pelvis was performed  Axial, sagittal, and coronal 2D reformatted images were created from the source data and submitted for interpretation      Radiation dose length product (DLP) for this visit:  718 mGy-cm   This examination, like all CT scans performed in the [facility], was performed utilizing techniques to minimize radiation dose exposure, including the use of iterative   reconstruction and automated exposure control      IV Contrast:  100 mL of iohexol (OMNIPAQUE) <See Chart>  of iohexol (OMNIPAQUE)  Enteric Contrast:  Enteric contrast was not administered      FINDINGS:     ABDOMEN     LOWER CHEST:  Atelectatic changes are noted at the lung bases        LIVER/BILIARY TREE:  There are several scattered hepatic hypodensities measuring up to 15 mm likely representing cysts    No prior studies are available for comparison  There is no intrahepatic biliary ductal dilatation      GALLBLADDER:  No calcified gallstones  No pericholecystic inflammatory change      SPLEEN:  Unremarkable      PANCREAS:  Unremarkable      ADRENAL GLANDS:  There is a 16 mm soft tissue nodule arising from the lateral limb of the left adrenal gland  The right adrenal gland is unremarkable      KIDNEYS/URETERS:  No hydronephrosis  There is a thinly septated cyst at the left renal lower pole measuring 5 0 cm  A right renal midpole cyst measures 4 6 cm      STOMACH AND BOWEL:  There is a segment of moderate colonic wall thickening involving the cecum and ascending colon suspicious for an infectious or inflammatory colitis  There is no significant pericolonic inflammatory stranding  No evidence of large or   small bowel obstruction      APPENDIX:  No findings to suggest appendicitis      ABDOMINOPELVIC CAVITY:  No ascites  No pneumoperitoneum  No lymphadenopathy      VESSELS:  Atherosclerotic changes are present  No evidence of aneurysm      PELVIS     REPRODUCTIVE ORGANS:  The prostate is enlarged      URINARY BLADDER:  Unremarkable      ABDOMINAL WALL/INGUINAL REGIONS:  Unremarkable      OSSEOUS STRUCTURES:  No acute fracture or destructive osseous lesion      IMPRESSION:     Moderate circumferential wall thickening involving the cecum and ascending colon suspicious for an infectious or inflammatory colitis  A nonemergent colonoscopy is recommended to exclude a colonic neoplasm      No evidence of large or small bowel obstruction      No free air or free fluid      Prostatomegaly      Several scattered hepatic hypodensities likely representing cysts  No prior studies are available for comparison    Consider a nonemergent dedicated enhanced CT or MRI of the liver for further characterization        EKG, Pathology, and Other Studies: I have personally reviewed pertinent films in PACS    Counseling / Coordination of Care  Total floor / unit time spent today forty minutes  Greater than 50% of total time was spent with the patient and / or family counseling and / or coordination of care  A description of the counseling / coordination of care:  Review of diagnostic imaging laboratory studies, personal examination patient, discussion with Dr Miriam Abrams who also examined the patient at this time all conducted in the general surgical evaluation the patient      Gregory Sanders PA-C

## 2022-10-28 NOTE — ASSESSMENT & PLAN NOTE
· Patient not receiving home amlodipine or irbesartan at this time, was held due to pre-hospital accounts of low blood pressure  · Currently blood pressure fluctuating between upper normal values->elevated, uncertain if this is due to uncontrolled hypertension or agitation due to delirium  · Will continue to monitor BP and restart amlodipine should BP remain elevated

## 2022-10-28 NOTE — PROGRESS NOTES
[facility] [address]  Progress Note - Maribell Lee 11/7/1933, 80 y o  male MRN: [id number]  Unit/Bed#: 651-70 Encounter: [id number]  Primary Care Provider: Nhan Barron DO   Date and time admitted to hospital: 10/27/2022 10:38 PM    Acute colitis  Assessment & Plan  · H&P listed sepsis as one of the primary diagnoses, which was reasonable to presume at the time given colitis and delirium  Now it appears SIRS criteria have yet to manifest themselves, so sepsis has been ruled out of the differential   · CT abd/pelvis showed acute colitis as likely infectious source  · On ceftriaxone D#1/5 plus metronidazole D#1/4  · Monitor vitals  · Daily CBC, WBC mildly elevated and downtrending  · Acute care surgery and GI consulted, will appreciate recs  · Diet: surgical diet with clear liquid and toast  · Because on PO diet, will switch IV PPI to PO  · Continue maintenance fluids    Acute delirium  Assessment & Plan  · Patient was noted to occasionally thrash and strike out at staff, violent restraints ordered but none in place currently as he is not exhibiting aggression at this time  · S/p x1 haloperidol dose  · Continue home seroquel, trazodone, memantine  · Valproate levels low on admission, restart PTA valproate  · Live video monitoring ongoing    Severe dementia with agitation  Assessment & Plan  See acute delirium A&P    Acute kidney injury ([facility] [address] )  Assessment & Plan  · Creatinine 1 39->1 06 overnight, currently no CESAR  · Possibly secondary to hypovolemia, patient s/p 2L fluid bolus  · Continue maintenance fluids  · Avoid nephrotoxic agents    Primary hypertension  Assessment & Plan  · Patient not receiving home amlodipine or irbesartan at this time, was held due to pre-hospital accounts of low blood pressure  · Currently blood pressure fluctuating between upper normal values->elevated, uncertain if this is due to uncontrolled hypertension or agitation due to delirium    · Will continue to monitor BP and restart amlodipine should BP remain elevated  Hypokalemia  Assessment & Plan  · K+ 3 3 today, will replete  · Monitor daily CMP, replete as needed    Lactic acidosis  Assessment & Plan  · Lactate 6 1 on admit, now 2 0 s/p 2L fluid bolus resuscitation  · Likely 2/2 hypovolemia, resolved      VTE Pharmacologic Prophylaxis:   Pharmacologic:  Heparin  Mechanical VTE Prophylaxis in Place: Yes    Patient Centered Rounds: I have performed bedside rounds today    Discussions with Specialists or Other Care Team Provider: GI & acute surgical care consulted, will appreciate recs    Education and Discussions with Family / Patient: attempted to call patient contacts today, will reach out again in afternoon  Time Spent for Care: 30 minutes  More than 50% of total time spent on counseling and coordination of care as described above  Current Length of Stay: 0 day(s)    Current Patient Status: Inpatient   Certification Statement: The patient will continue to require additional inpatient hospital stay due to delirium in the setting of acute colitis    Discharge Plan: 1-2 days    Code Status: Level 3 - DNAR and DNI      Subjective:   Patient is a resident of [facility], which found him down and unresponsive and apparently with low blood pressure prior to arrival   Patient received x2 fluid boluses and was worked up for sepsis though SIRS criteria did not fully manifest   He received x1 haloperidol due to aggressive behavior, he has a history of dementia  Patient was started on ceftriaxone day  and metronidazole day 1  Today patient feels well, video observer is present in the room  He is not aggressive at this time and is willing to communicate, he denies any fever chills nausea vomiting constipation diarrhea or pain  I explained that he was in the hospital, and he seemed mildly surprised      Objective:     Vitals:   Temp (24hrs), Av 5 °F (36 4 °C), Min:97 2 °F (36 2 °C), Max:98 °F (36 7 °C)    Temp:  [97 2 °F (36 2 °C)-98 °F (36 7 °C)] 98 °F (36 7 °C)  HR:  [66-84] 69  Resp:  [16-20] 20  BP: (124-177)/() 155/102  SpO2:  [94 %-98 %] 98 %  Body mass index is 23 85 kg/m²  Input and Output Summary (last 24 hours): Intake/Output Summary (Last 24 hours) at 10/28/2022 1324  Last data filed at 10/28/2022 0816  Gross per 24 hour   Intake 10 ml   Output 400 ml   Net -390 ml       Physical Exam:     Physical Exam  Vitals and nursing note reviewed  Constitutional:       General: He is not in acute distress  Appearance: Normal appearance  He is well-developed  He is not ill-appearing  Comments: Mildly tremulous with arms outstretched   HENT:      Head: Normocephalic and atraumatic  Eyes:      General: No scleral icterus  Right eye: No discharge  Left eye: No discharge  Conjunctiva/sclera: Conjunctivae normal    Cardiovascular:      Rate and Rhythm: Normal rate and regular rhythm  Pulses: Normal pulses  Heart sounds: Normal heart sounds  No murmur heard  No friction rub  No gallop  Pulmonary:      Effort: Pulmonary effort is normal  No respiratory distress  Breath sounds: Normal breath sounds  No wheezing, rhonchi or rales  Abdominal:      General: Abdomen is flat  Bowel sounds are normal  There is no distension  Palpations: Abdomen is soft  There is no mass  Tenderness: There is no abdominal tenderness  There is no guarding or rebound  Musculoskeletal:         General: No swelling or tenderness  Cervical back: Neck supple  Skin:     General: Skin is warm and dry  Findings: No bruising or rash  Neurological:      Mental Status: He is alert  Psychiatric:         Mood and Affect: Mood normal       Comments: Patient calm throughout exam and cooperative, appreciated humor but seemed to respond oddly to some questions   Could follow request to lean forward for lung exam, but when told to take a deep breath he stated "all right, take a deep breath" whenever I asked but did not do so  Additional Data:     Labs:    Results from last 7 days   Lab Units 10/28/22  0524 10/27/22  2309   WBC Thousand/uL 10 18* 11 84*   HEMOGLOBIN g/dL 12 9 14 8   HEMATOCRIT % 38 2 45 5   PLATELETS Thousands/uL 176 188   NEUTROS PCT %  --  81*   LYMPHS PCT %  --  9*   MONOS PCT %  --  9   EOS PCT %  --  1     Results from last 7 days   Lab Units 10/28/22  0524 10/27/22  2309   SODIUM mmol/L 143 141   POTASSIUM mmol/L 3 3* 4 3   CHLORIDE mmol/L 107 109*   CO2 mmol/L 23 22   BUN mg/dL 17 22   CREATININE mg/dL 1 06 1 39*   ANION GAP mmol/L 13 10   CALCIUM mg/dL 8 3 8 9   ALBUMIN g/dL  --  3 2*   TOTAL BILIRUBIN mg/dL  --  0 34   ALK PHOS U/L  --  61   ALT U/L  --  17   AST U/L  --  22   GLUCOSE RANDOM mg/dL 96 125     Results from last 7 days   Lab Units 10/28/22  0524   INR  1 11             Results from last 7 days   Lab Units 10/28/22  0524 10/28/22  0142   LACTIC ACID mmol/L 2 0 6 1*           * I Have Reviewed All Lab Data Listed Above  * Additional Pertinent Lab Tests Reviewed: Mary Lopez Admission Reviewed    Imaging:    Imaging Reports Reviewed Today Include: admission to current time  Imaging Personally Reviewed by Myself Includes:  Briefly viewed same    Recent Cultures (last 7 days):     Results from last 7 days   Lab Units 10/28/22  0138   BLOOD CULTURE  Received in Microbiology Lab  Culture in Progress  Received in Microbiology Lab  Culture in Progress         Last 24 Hours Medication List:   Current Facility-Administered Medications   Medication Dose Route Frequency Provider Last Rate   • acetaminophen  650 mg Oral Q6H PRN Speedy Holder MD     • [START ON 10/29/2022] cefTRIAXone  1,000 mg Intravenous Q24H Speedy Holder MD     • divalproex sodium  125 mg Oral Q12H [address] Katey Cherry MD     • donepezil  10 mg Oral HS Speedy Holder MD     • heparin (porcine)  5,000 Units Subcutaneous Q12H [address] Linwood Mtz MD Pablo     • iohexol  100 mL Intravenous Once in imaging Rony Bellamy MD     • memantine  5 mg Oral Daily Rony Bellamy MD     • metroNIDAZOLE  500 mg Intravenous Q8H Rony Bellamy  mg (10/28/22 1147)   • ondansetron  4 mg Intravenous Q6H PRN Rony Bellamy MD     • pantoprazole  40 mg Oral Daily Yanet Lawrence MD     • potassium chloride  40 mEq Oral Once Yanet Lawernce MD     • pravastatin  40 mg Oral HS Rony Bellamy MD     • QUEtiapine  25 mg Oral Early Morning Rony Bellamy MD     • QUEtiapine  50 mg Oral HS Rony Bellamy MD     • sodium chloride  100 mL/hr Intravenous Continuous Rony Bellamy  mL/hr (10/28/22 9762)   • traZODone  50 mg Oral HS Rony Bellamy MD          Today, Patient Was Seen By: Yanet Lawrence    ** Please Note: Dictation voice to text software may have been used in the creation of this document   **

## 2022-10-28 NOTE — NURSING NOTE
Code status information not received from facility- spoke to Emerald Rojas @ [phone] pt is DNR  Will fax paperwork over for review

## 2022-10-28 NOTE — ASSESSMENT & PLAN NOTE
· Patient was noted to occasionally thrash and strike out at staff, violent restraints ordered but none in place currently as he is not exhibiting aggression at this time    · S/p x1 haloperidol dose  · Continue home seroquel, trazodone, memantine  · Valproate levels low on admission, restart PTA valproate  · Live video monitoring ongoing

## 2022-10-28 NOTE — PLAN OF CARE
Problem: PAIN - ADULT  Goal: Verbalizes/displays adequate comfort level or baseline comfort level  Description: Interventions:  - Encourage patient to monitor pain and request assistance  - Assess pain using appropriate pain scale  - Administer analgesics based on type and severity of pain and evaluate response  - Implement non-pharmacological measures as appropriate and evaluate response  - Consider cultural and social influences on pain and pain management  - Notify physician/advanced practitioner if interventions unsuccessful or patient reports new pain  Outcome: Progressing     Problem: INFECTION - ADULT  Goal: Absence or prevention of progression during hospitalization  Description: INTERVENTIONS:  - Assess and monitor for signs and symptoms of infection  - Monitor lab/diagnostic results  - Monitor all insertion sites, i e  indwelling lines, tubes, and drains  - Monitor endotracheal if appropriate and nasal secretions for changes in amount and color  - [place] appropriate cooling/warming therapies per order  - Administer medications as ordered  - Instruct and encourage patient and family to use good hand hygiene technique  - Identify and instruct in appropriate isolation precautions for identified infection/condition  Outcome: Progressing  Goal: Absence of fever/infection during neutropenic period  Description: INTERVENTIONS:  - Monitor WBC    Outcome: Progressing     Problem: SAFETY ADULT  Goal: Patient will remain free of falls  Description: INTERVENTIONS:  - Educate patient/family on patient safety including physical limitations  - Instruct patient to call for assistance with activity   - Consult OT/PT to assist with strengthening/mobility   - Keep Call bell within reach  - Keep bed low and locked with side rails adjusted as appropriate  - Keep care items and personal belongings within reach  - Initiate and maintain comfort rounds  - Make Fall Risk Sign visible to staff  - Offer Toileting every 1-2 Hours, in advance of need  - Initiate/Maintain bed and chair alarm  - Obtain necessary fall risk management equipment: fall socks and call bell in place  - Apply yellow socks and bracelet for high fall risk patients  - Consider moving patient to room near nurses station  Outcome: Progressing  Goal: Maintain or return to baseline ADL function  Description: INTERVENTIONS:  -  Assess patient's ability to carry out ADLs; assess patient's baseline for ADL function and identify physical deficits which impact ability to perform ADLs (bathing, care of mouth/teeth, toileting, grooming, dressing, etc )  - Assess/evaluate cause of self-care deficits   - Assess range of motion  - Assess patient's mobility; develop plan if impaired  - Assess patient's need for assistive devices and provide as appropriate  - Encourage maximum independence but intervene and supervise when necessary  - Involve family in performance of ADLs  - Assess for home care needs following discharge   - Consider OT consult to assist with ADL evaluation and planning for discharge  - Provide patient education as appropriate  Outcome: Progressing  Goal: Maintains/Returns to pre admission functional level  Description: INTERVENTIONS:  - Perform BMAT or MOVE assessment daily    - Set and communicate daily mobility goal to care team and patient/family/caregiver  - Collaborate with rehabilitation services on mobility goals if consulted  - Perform Range of Motion 3-4 times a day  - Reposition patient every 1-2 hours    - Dangle patient 3-4 times a day  - Stand patient 3-4 times a day  - Ambulate patient 3-4 times a day  - Out of bed to chair 3-4 times a day   - Out of bed for meals 3-4 times a day  - Out of bed for toileting  - Record patient progress and toleration of activity level   Outcome: Progressing     Problem: DISCHARGE PLANNING  Goal: Discharge to home or other facility with appropriate resources  Description: INTERVENTIONS:  - Identify barriers to discharge w/patient and caregiver  - Arrange for needed discharge resources and transportation as appropriate  - Identify discharge learning needs (meds, wound care, etc )  - Arrange for interpretive services to assist at discharge as needed  - Refer to Case Management Department for coordinating discharge planning if the patient needs post-hospital services based on physician/advanced practitioner order or complex needs related to functional status, cognitive ability, or social support system  Outcome: Progressing     Problem: Knowledge Deficit  Goal: Patient/family/caregiver demonstrates understanding of disease process, treatment plan, medications, and discharge instructions  Description: Complete learning assessment and assess knowledge base    Interventions:  - Provide teaching at level of understanding  - Provide teaching via preferred learning methods  Outcome: Progressing     Problem: SAFETY,RESTRAINT: NV/NON-SELF DESTRUCTIVE BEHAVIOR  Goal: Remains free of harm/injury (restraint for non violent/non self-detsructive behavior)  Description: INTERVENTIONS:  - Instruct patient/family regarding restraint use   - Assess and monitor physiologic and psychological status   - Provide interventions and comfort measures to meet assessed patient needs   - Identify and implement measures to help patient regain control  - Assess readiness for release of restraint   Outcome: Progressing  Goal: Returns to optimal restraint-free functioning  Description: INTERVENTIONS:  - Assess the patient's behavior and symptoms that indicate continued need for restraint  - Identify and implement measures to help patient regain control  - Assess readiness for release of restraint   Outcome: Progressing     Problem: Potential for Falls  Goal: Patient will remain free of falls  Description: INTERVENTIONS:  - Educate patient/family on patient safety including physical limitations  - Instruct patient to call for assistance with activity   - Consult OT/PT to assist with strengthening/mobility   - Keep Call bell within reach  - Keep bed low and locked with side rails adjusted as appropriate  - Keep care items and personal belongings within reach  - Initiate and maintain comfort rounds  - Make Fall Risk Sign visible to staff  - Offer Toileting every 1-2 Hours, in advance of need  - Initiate/Maintain bed and chair alarm  - Obtain necessary fall risk management equipment: fall socks and call bell in place  - Apply yellow socks and bracelet for high fall risk patients  - Consider moving patient to room near nurses station  Outcome: Progressing     Problem: Prexisting or High Potential for Compromised Skin Integrity  Goal: Skin integrity is maintained or improved  Description: INTERVENTIONS:  - Identify patients at risk for skin breakdown  - Assess and monitor skin integrity  - Assess and monitor nutrition and hydration status  - Monitor labs   - Assess for incontinence   - Turn and reposition patient  - Assist with mobility/ambulation  - Relieve pressure over bony prominences  - Avoid friction and shearing  - Provide appropriate hygiene as needed including keeping skin clean and dry  - Evaluate need for skin moisturizer/barrier cream  - Collaborate with interdisciplinary team   - Patient/family teaching  - Consider wound care consult   Outcome: Progressing     Problem: MOBILITY - ADULT  Goal: Maintain or return to baseline ADL function  Description: INTERVENTIONS:  -  Assess patient's ability to carry out ADLs; assess patient's baseline for ADL function and identify physical deficits which impact ability to perform ADLs (bathing, care of mouth/teeth, toileting, grooming, dressing, etc )  - Assess/evaluate cause of self-care deficits   - Assess range of motion  - Assess patient's mobility; develop plan if impaired  - Assess patient's need for assistive devices and provide as appropriate  - Encourage maximum independence but intervene and supervise when necessary  - Involve family in performance of ADLs  - Assess for home care needs following discharge   - Consider OT consult to assist with ADL evaluation and planning for discharge  - Provide patient education as appropriate  Outcome: Progressing  Goal: Maintains/Returns to pre admission functional level  Description: INTERVENTIONS:  - Perform BMAT or MOVE assessment daily    - Set and communicate daily mobility goal to care team and patient/family/caregiver  - Collaborate with rehabilitation services on mobility goals if consulted  - Perform Range of Motion 3-4 times a day  - Reposition patient every 1-2 hours    - Dangle patient 3-4 times a day  - Stand patient 3-4 times a day  - Ambulate patient 3-4 times a day  - Out of bed to chair 3-4 times a day   - Out of bed for meals 3-4 times a day  - Out of bed for toileting  - Record patient progress and toleration of activity level   Outcome: Progressing

## 2022-10-28 NOTE — PLAN OF CARE
Problem: PAIN - ADULT  Goal: Verbalizes/displays adequate comfort level or baseline comfort level  Description: Interventions:  - Encourage patient to monitor pain and request assistance  - Assess pain using appropriate pain scale  - Administer analgesics based on type and severity of pain and evaluate response  - Implement non-pharmacological measures as appropriate and evaluate response  - Consider cultural and social influences on pain and pain management  - Notify physician/advanced practitioner if interventions unsuccessful or patient reports new pain  Outcome: Progressing     Problem: SAFETY ADULT  Goal: Patient will remain free of falls  Description: INTERVENTIONS:  - Educate patient/family on patient safety including physical limitations  - Instruct patient to call for assistance with activity   - Consult OT/PT to assist with strengthening/mobility   - Keep Call bell within reach  - Keep bed low and locked with side rails adjusted as appropriate  - Keep care items and personal belongings within reach  - Initiate and maintain comfort rounds  - Make Fall Risk Sign visible to staff  - Offer Toileting every 1-2 Hours, in advance of need  - Initiate/Maintain bed and chair alarm  - Obtain necessary fall risk management equipment: fall socks and call bell in place  - Apply yellow socks and bracelet for high fall risk patients  - Consider moving patient to room near nurses station  Outcome: Progressing  Goal: Maintain or return to baseline ADL function  Description: INTERVENTIONS:  -  Assess patient's ability to carry out ADLs; assess patient's baseline for ADL function and identify physical deficits which impact ability to perform ADLs (bathing, care of mouth/teeth, toileting, grooming, dressing, etc )  - Assess/evaluate cause of self-care deficits   - Assess range of motion  - Assess patient's mobility; develop plan if impaired  - Assess patient's need for assistive devices and provide as appropriate  - Encourage maximum independence but intervene and supervise when necessary  - Involve family in performance of ADLs  - Assess for home care needs following discharge   - Consider OT consult to assist with ADL evaluation and planning for discharge  - Provide patient education as appropriate  Outcome: Progressing  Goal: Maintains/Returns to pre admission functional level  Description: INTERVENTIONS:  - Perform BMAT or MOVE assessment daily    - Set and communicate daily mobility goal to care team and patient/family/caregiver  - Collaborate with rehabilitation services on mobility goals if consulted  - Perform Range of Motion 3-4 times a day  - Reposition patient every 1-2 hours  - Dangle patient 3-4 times a day  - Stand patient 3-4 times a day  - Ambulate patient 3-4 times a day  - Out of bed to chair 3-4 times a day   - Out of bed for meals 3-4 times a day  - Out of bed for toileting  - Record patient progress and toleration of activity level   Outcome: Progressing     Problem: DISCHARGE PLANNING  Goal: Discharge to home or other facility with appropriate resources  Description: INTERVENTIONS:  - Identify barriers to discharge w/patient and caregiver  - Arrange for needed discharge resources and transportation as appropriate  - Identify discharge learning needs (meds, wound care, etc )  - Arrange for interpretive services to assist at discharge as needed  - Refer to Case Management Department for coordinating discharge planning if the patient needs post-hospital services based on physician/advanced practitioner order or complex needs related to functional status, cognitive ability, or social support system  Outcome: Progressing     Problem: Knowledge Deficit  Goal: Patient/family/caregiver demonstrates understanding of disease process, treatment plan, medications, and discharge instructions  Description: Complete learning assessment and assess knowledge base    Interventions:  - Provide teaching at level of understanding  - Provide teaching via preferred learning methods  Outcome: Progressing     Problem: SAFETY,RESTRAINT: NV/NON-SELF DESTRUCTIVE BEHAVIOR  Goal: Remains free of harm/injury (restraint for non violent/non self-detsructive behavior)  Description: INTERVENTIONS:  - Instruct patient/family regarding restraint use   - Assess and monitor physiologic and psychological status   - Provide interventions and comfort measures to meet assessed patient needs   - Identify and implement measures to help patient regain control  - Assess readiness for release of restraint   Outcome: Progressing  Goal: Returns to optimal restraint-free functioning  Description: INTERVENTIONS:  - Assess the patient's behavior and symptoms that indicate continued need for restraint  - Identify and implement measures to help patient regain control  - Assess readiness for release of restraint   Outcome: Progressing     Problem: Potential for Falls  Goal: Patient will remain free of falls  Description: INTERVENTIONS:  - Educate patient/family on patient safety including physical limitations  - Instruct patient to call for assistance with activity   - Consult OT/PT to assist with strengthening/mobility   - Keep Call bell within reach  - Keep bed low and locked with side rails adjusted as appropriate  - Keep care items and personal belongings within reach  - Initiate and maintain comfort rounds  - Make Fall Risk Sign visible to staff  - Offer Toileting every 1-2 Hours, in advance of need  - Initiate/Maintain bed and chair alarm  - Obtain necessary fall risk management equipment: fall socks and call bell in place  - Apply yellow socks and bracelet for high fall risk patients  - Consider moving patient to room near nurses station  Outcome: Progressing     Problem: Prexisting or High Potential for Compromised Skin Integrity  Goal: Skin integrity is maintained or improved  Description: INTERVENTIONS:  - Identify patients at risk for skin breakdown  - Assess and monitor skin integrity  - Assess and monitor nutrition and hydration status  - Monitor labs   - Assess for incontinence   - Turn and reposition patient  - Assist with mobility/ambulation  - Relieve pressure over bony prominences  - Avoid friction and shearing  - Provide appropriate hygiene as needed including keeping skin clean and dry  - Evaluate need for skin moisturizer/barrier cream  - Collaborate with interdisciplinary team   - Patient/family teaching  - Consider wound care consult   Outcome: Progressing     Problem: MOBILITY - ADULT  Goal: Maintain or return to baseline ADL function  Description: INTERVENTIONS:  -  Assess patient's ability to carry out ADLs; assess patient's baseline for ADL function and identify physical deficits which impact ability to perform ADLs (bathing, care of mouth/teeth, toileting, grooming, dressing, etc )  - Assess/evaluate cause of self-care deficits   - Assess range of motion  - Assess patient's mobility; develop plan if impaired  - Assess patient's need for assistive devices and provide as appropriate  - Encourage maximum independence but intervene and supervise when necessary  - Involve family in performance of ADLs  - Assess for home care needs following discharge   - Consider OT consult to assist with ADL evaluation and planning for discharge  - Provide patient education as appropriate  Outcome: Progressing  Goal: Maintains/Returns to pre admission functional level  Description: INTERVENTIONS:  - Perform BMAT or MOVE assessment daily    - Set and communicate daily mobility goal to care team and patient/family/caregiver  - Collaborate with rehabilitation services on mobility goals if consulted  - Perform Range of Motion 3-4 times a day  - Reposition patient every 1-2 hours    - Dangle patient 3-4 times a day  - Stand patient 3-4 times a day  - Ambulate patient 3-4 times a day  - Out of bed to chair 3-4 times a day   - Out of bed for meals 3-4 times a day  - Out of bed for toileting  - Record patient progress and toleration of activity level   Outcome: Progressing

## 2022-10-28 NOTE — ED NOTES
Patient removed IV in rt AC  Provider is aware  New IV put in at this time       Siddharth Earl, RN  10/28/22 9839

## 2022-10-28 NOTE — QUICK NOTE
Attempted to reach out by phone to both of the patient's listed contacts multiple times today   Left a voicemail with one of them in hopes that they will contact us so we can exchange information related to:  A) Patient's status and plan (information for family)  B) Patient's baseline functioning (information for us to weigh the extent of his delirium as the hospital stay progresses)  C) Details about patient's assisted living situation and length of stay at [facility]

## 2022-10-29 LAB
ALBUMIN SERPL BCP-MCNC: 3.1 G/DL (ref 3.5–5)
ALP SERPL-CCNC: 65 U/L (ref 46–116)
ALT SERPL W P-5'-P-CCNC: 15 U/L (ref 12–78)
ANION GAP SERPL CALCULATED.3IONS-SCNC: 8 MMOL/L (ref 4–13)
AST SERPL W P-5'-P-CCNC: 21 U/L (ref 5–45)
BILIRUB SERPL-MCNC: 0.6 MG/DL (ref 0.2–1)
BUN SERPL-MCNC: 8 MG/DL (ref 5–25)
CALCIUM ALBUM COR SERPL-MCNC: 9.6 MG/DL (ref 8.3–10.1)
CALCIUM SERPL-MCNC: 8.9 MG/DL (ref 8.3–10.1)
CHLORIDE SERPL-SCNC: 108 MMOL/L (ref 96–108)
CO2 SERPL-SCNC: 27 MMOL/L (ref 21–32)
CREAT SERPL-MCNC: 0.98 MG/DL (ref 0.6–1.3)
ERYTHROCYTE [DISTWIDTH] IN BLOOD BY AUTOMATED COUNT: 13.1 % (ref 11.6–15.1)
GFR SERPL CREATININE-BSD FRML MDRD: 68 ML/MIN/1.73SQ M
GLUCOSE SERPL-MCNC: 87 MG/DL (ref 65–140)
HCT VFR BLD AUTO: 40.1 % (ref 36.5–49.3)
HGB BLD-MCNC: 13.4 G/DL (ref 12–17)
MCH RBC QN AUTO: 30.8 PG (ref 26.8–34.3)
MCHC RBC AUTO-ENTMCNC: 33.4 G/DL (ref 31.4–37.4)
MCV RBC AUTO: 92 FL (ref 82–98)
MRSA NOSE QL CULT: NORMAL
PLATELET # BLD AUTO: 165 THOUSANDS/UL (ref 149–390)
PMV BLD AUTO: 10.4 FL (ref 8.9–12.7)
POTASSIUM SERPL-SCNC: 3.4 MMOL/L (ref 3.5–5.3)
PROT SERPL-MCNC: 6.4 G/DL (ref 6.4–8.4)
RBC # BLD AUTO: 4.35 MILLION/UL (ref 3.88–5.62)
SODIUM SERPL-SCNC: 143 MMOL/L (ref 135–147)
WBC # BLD AUTO: 6.82 THOUSAND/UL (ref 4.31–10.16)

## 2022-10-29 RX ORDER — POTASSIUM CHLORIDE 20 MEQ/1
40 TABLET, EXTENDED RELEASE ORAL ONCE
Status: COMPLETED | OUTPATIENT
Start: 2022-10-29 | End: 2022-10-29

## 2022-10-29 RX ORDER — HALOPERIDOL 5 MG/ML
5 INJECTION INTRAMUSCULAR ONCE
Status: COMPLETED | OUTPATIENT
Start: 2022-10-29 | End: 2022-10-29

## 2022-10-29 RX ADMIN — METRONIDAZOLE 500 MG: 5 INJECTION, SOLUTION INTRAVENOUS at 12:20

## 2022-10-29 RX ADMIN — METRONIDAZOLE 500 MG: 5 INJECTION, SOLUTION INTRAVENOUS at 20:11

## 2022-10-29 RX ADMIN — DIVALPROEX SODIUM 125 MG: 125 CAPSULE, COATED PELLETS ORAL at 09:05

## 2022-10-29 RX ADMIN — TRAZODONE HYDROCHLORIDE 50 MG: 50 TABLET ORAL at 21:20

## 2022-10-29 RX ADMIN — CEFTRIAXONE 1000 MG: 1 INJECTION, SOLUTION INTRAVENOUS at 00:49

## 2022-10-29 RX ADMIN — PANTOPRAZOLE SODIUM 40 MG: 40 TABLET, DELAYED RELEASE ORAL at 09:05

## 2022-10-29 RX ADMIN — HEPARIN SODIUM 5000 UNITS: 5000 INJECTION INTRAVENOUS; SUBCUTANEOUS at 09:06

## 2022-10-29 RX ADMIN — AMLODIPINE BESYLATE 2.5 MG: 2.5 TABLET ORAL at 09:05

## 2022-10-29 RX ADMIN — PRAVASTATIN SODIUM 40 MG: 40 TABLET ORAL at 21:20

## 2022-10-29 RX ADMIN — DONEPEZIL HYDROCHLORIDE 10 MG: 5 TABLET ORAL at 21:20

## 2022-10-29 RX ADMIN — METRONIDAZOLE 500 MG: 5 INJECTION, SOLUTION INTRAVENOUS at 03:47

## 2022-10-29 RX ADMIN — DIVALPROEX SODIUM 125 MG: 125 CAPSULE, COATED PELLETS ORAL at 21:20

## 2022-10-29 RX ADMIN — QUETIAPINE FUMARATE 25 MG: 25 TABLET ORAL at 05:31

## 2022-10-29 RX ADMIN — HEPARIN SODIUM 5000 UNITS: 5000 INJECTION INTRAVENOUS; SUBCUTANEOUS at 21:21

## 2022-10-29 RX ADMIN — POTASSIUM CHLORIDE 40 MEQ: 1500 TABLET, EXTENDED RELEASE ORAL at 17:06

## 2022-10-29 RX ADMIN — MEMANTINE 5 MG: 5 TABLET ORAL at 09:05

## 2022-10-29 RX ADMIN — SODIUM CHLORIDE 100 ML/HR: 0.9 INJECTION, SOLUTION INTRAVENOUS at 00:50

## 2022-10-29 RX ADMIN — QUETIAPINE FUMARATE 50 MG: 25 TABLET ORAL at 21:20

## 2022-10-29 RX ADMIN — HALOPERIDOL LACTATE 5 MG: 5 INJECTION, SOLUTION INTRAMUSCULAR at 23:12

## 2022-10-29 NOTE — PROGRESS NOTES
A small amount of bright red blood noted coming from patient penis during urination, provider made aware of patient's condition, provider advised the we monitor the patient and collect CBC in the morning

## 2022-10-29 NOTE — PROGRESS NOTES
[id number] Located within [facility] [address]  Progress Note - Moon Proper 1933, 80 y o  male MRN: [id number]  Unit/Bed#: 895-52 Encounter: [id number]  Primary Care Provider: Joaquin Henao DO   Date and time admitted to hospital: 10/27/2022 10:38 PM    * Acute colitis  Assessment & Plan  · H&P listed sepsis as one of the primary diagnoses, which was reasonable to presume at the time given colitis and delirium  Now it appears SIRS criteria have yet to manifest themselves, so sepsis has been ruled out of the differential   · CT abd/pelvis showed acute colitis as likely infectious source  · Continue ceftriaxone and Flagyl for at least an additional 24 hours  · Possible ischemic colitis versus infectious colitis present on admission    · Acute care surgery and GI consulted, will appreciate recs  · Diet: surgical diet with clear liquid and toast    Lactic acidosis  Assessment & Plan  · Lactate 6 1 on admit, now 2 0 s/p 2L fluid bolus resuscitation  · Likely 2/2 hypovolemia, resolved    Acute kidney injury ([facility] [address] )  Assessment & Plan  Acute kidney injury was present on admission, resolved    Acute delirium  Assessment & Plan  Acute metabolic encephalopathy, present on admission, as evidenced by increased confusion per the nursing facility, transient hypotension, patient does have underlying dementia      Severe dementia with agitation  Assessment & Plan  Underlying dementia    Hypokalemia  Assessment & Plan  · Monitor potassium level and replace as needed      Primary hypertension  Assessment & Plan  · Norvasc 2 5 mg daily restarted, continue to hold irbesartan        Code Status: Level 3 - DNAR and DNI    Subjective:   Patient is not a reliable historian  Objective:     Vitals:   Temp (24hrs), Av 6 °F (36 4 °C), Min:97 2 °F (36 2 °C), Max:98 °F (36 7 °C)    Temp:  [97 2 °F (36 2 °C)-98 °F (36 7 °C)] 97 7 °F (36 5 °C)  HR:  [61-82] 61  Resp:  [16-20] 20  BP: (147-177)/() 160/93  SpO2:  [97 %-98 %] 98 %  Body mass index is 22 84 kg/m²  Input and Output Summary (last 24 hours): Intake/Output Summary (Last 24 hours) at 10/29/2022 1432  Last data filed at 10/29/2022 1217  Gross per 24 hour   Intake 840 ml   Output 3675 ml   Net -2835 ml       Physical Exam:   Physical Exam  Vitals and nursing note reviewed  HENT:      Head: Normocephalic and atraumatic  Right Ear: External ear normal       Left Ear: External ear normal    Cardiovascular:      Rate and Rhythm: Normal rate  Pulses: Normal pulses  Pulmonary:      Effort: Pulmonary effort is normal    Abdominal:      General: Abdomen is flat  There is no distension  Palpations: Abdomen is soft  There is no mass  Musculoskeletal:         General: Normal range of motion  Cervical back: Normal range of motion  Neurological:      General: No focal deficit present  Mental Status: He is alert  Mental status is at baseline  Additional Data:     Labs:  Results from last 7 days   Lab Units 10/29/22  0519 10/28/22  0524 10/27/22  2309   WBC Thousand/uL 6 82   < > 11 84*   HEMOGLOBIN g/dL 13 4   < > 14 8   HEMATOCRIT % 40 1   < > 45 5   PLATELETS Thousands/uL 165   < > 188   NEUTROS PCT %  --   --  81*   LYMPHS PCT %  --   --  9*   MONOS PCT %  --   --  9   EOS PCT %  --   --  1    < > = values in this interval not displayed       Results from last 7 days   Lab Units 10/29/22  0519   SODIUM mmol/L 143   POTASSIUM mmol/L 3 4*   CHLORIDE mmol/L 108   CO2 mmol/L 27   BUN mg/dL 8   CREATININE mg/dL 0 98   ANION GAP mmol/L 8   CALCIUM mg/dL 8 9   ALBUMIN g/dL 3 1*   TOTAL BILIRUBIN mg/dL 0 60   ALK PHOS U/L 65   ALT U/L 15   AST U/L 21   GLUCOSE RANDOM mg/dL 87     Results from last 7 days   Lab Units 10/28/22  0524   INR  1 11             Results from last 7 days   Lab Units 10/28/22  0524 10/28/22  0142   LACTIC ACID mmol/L 2 0 6 1*       Lines/Drains:  Invasive Devices  Report    Peripheral Intravenous Line  Duration           Peripheral IV 10/28/22 Right Antecubital 1 day                      Imaging: No pertinent imaging reviewed  Recent Cultures (last 7 days):   Results from last 7 days   Lab Units 10/28/22  0138   BLOOD CULTURE  No Growth at 24 hrs  No Growth at 24 hrs  Last 24 Hours Medication List:   Current Facility-Administered Medications   Medication Dose Route Frequency Provider Last Rate   • acetaminophen  650 mg Oral Q6H PRN Jairo Hernandez MD     • amLODIPine  2 5 mg Oral Daily Rach Banks MD     • cefTRIAXone  1,000 mg Intravenous Q24H Jairo Hernandez MD 1,000 mg (10/29/22 0049)   • divalproex sodium  125 mg Oral Q12H [facility] Rach Banks MD     • donepezil  10 mg Oral HS Jairo Hernandez MD     • heparin (porcine)  5,000 Units Subcutaneous Q12H [facility] Jairo Hernandez MD     • iohexol  100 mL Intravenous Once in imaging Jairo Hernandez MD     • labetalol  10 mg Intravenous Q6H PRN Eyad Plata DO     • memantine  5 mg Oral Daily Jairo Hernandez MD     • metroNIDAZOLE  500 mg Intravenous Q8H Jairo Hernandez  mg (10/29/22 1220)   • ondansetron  4 mg Intravenous Q6H PRN Jairo Hernandez MD     • pantoprazole  40 mg Oral Daily Rach Banks MD     • potassium chloride  40 mEq Oral Once Eyad Plata DO     • pravastatin  40 mg Oral HS Jairo Hernandez MD     • QUEtiapine  25 mg Oral Early Morning Jairo Hernandez MD     • QUEtiapine  50 mg Oral HS Jairo Hernandez MD     • traZODone  50 mg Oral HS Jairo Hernandez MD          Today, Patient Was Seen By: Clara Mercer    **Please Note: This note may have been constructed using a voice recognition system  **

## 2022-10-29 NOTE — PLAN OF CARE
Problem: PAIN - ADULT  Goal: Verbalizes/displays adequate comfort level or baseline comfort level  Description: Interventions:  - Encourage patient to monitor pain and request assistance  - Assess pain using appropriate pain scale (0-10 pain scale)  - Administer analgesics based on type and severity of pain and evaluate response  - Implement non-pharmacological measures as appropriate and evaluate response  - Consider cultural and social influences on pain and pain management  - Notify physician/advanced practitioner if interventions unsuccessful or patient reports new pain  Outcome: Progressing     Problem: INFECTION - ADULT  Goal: Absence or prevention of progression during hospitalization  Description: INTERVENTIONS:  - Assess and monitor for signs and symptoms of infection  - Monitor lab/diagnostic results  - Monitor all insertion sites, i e  indwelling lines  - Administer medications as ordered  - Instruct and encourage patient and family to use good hand hygiene technique  Outcome: Progressing     Problem: SAFETY ADULT  Goal: Patient will remain free of falls  Description: INTERVENTIONS:  - Educate patient/family on patient safety including physical limitations  - Instruct patient to call for assistance with activity (min assist)  - Consult OT/PT to assist with strengthening/mobility   - Keep Call bell within reach  - Keep bed low and locked with side rails adjusted as appropriate  - Keep care items and personal belongings within reach  - Initiate and maintain comfort rounds  - Make Fall Risk Sign visible to staff (high fall risk)  - Offer Toileting every 1-2 Hours, in advance of need  - Initiate/Maintain bed/chair alarm  - Obtain necessary fall risk management equipment: nonskid footwear, call bell within reach  - Apply yellow socks and bracelet for high fall risk patients  - Consider moving patient to room near nurses station  Outcome: Progressing  Goal: Maintain or return to baseline ADL function  Description: INTERVENTIONS:  -  Assess patient's ability to carry out ADLs; (mod to max assist)  - Assess/evaluate cause of self-care deficits (confusion, fatigue, limited movement)  - Assess range of motion  - Assess patient's mobility; (min assist)  - Assess patient's need for assistive devices and provide as appropriate  - Encourage maximum independence but intervene and supervise when necessary  - Involve family in performance of ADLs  - Assess for home care needs following discharge   - Consider OT consult to assist with ADL evaluation and planning for discharge  - Provide patient education as appropriate  Outcome: Progressing  Goal: Maintains/Returns to pre admission functional level  Description: INTERVENTIONS:  - Perform BMAT or MOVE assessment daily    - Set and communicate daily mobility goal to care team and patient/family/caregiver  - Collaborate with rehabilitation services on mobility goals if consulted  - Perform Range of Motion 3-4 times a day  - Reposition patient every 1-2 hours    - Dangle patient 3-4 times a day  - Stand patient 3-4 times a day  - Ambulate patient 3-4 times a day  - Out of bed to chair 3-4 times a day   - Out of bed for meals 3-4 times a day  - Out of bed for toileting  - Record patient progress and toleration of activity level   Outcome: Progressing     Problem: DISCHARGE PLANNING  Goal: Discharge to home or other facility with appropriate resources  Description: INTERVENTIONS:  - Identify barriers to discharge w/patient and caregiver  - Arrange for needed discharge resources and transportation as appropriate  - Identify discharge learning needs (meds, wound care, etc )  - Refer to Case Management Department for coordinating discharge planning if the patient needs post-hospital services based on physician/advanced practitioner order or complex needs related to functional status, cognitive ability, or social support system  Outcome: Progressing     Problem: Knowledge Deficit  Goal: Patient/family/caregiver demonstrates understanding of disease process, treatment plan, medications, and discharge instructions  Description: Complete learning assessment and assess knowledge base    Interventions:  - Provide teaching at level of understanding  - Provide teaching via preferred learning methods  Outcome: Progressing     Problem: Potential for Falls  Goal: Patient will remain free of falls  Description: INTERVENTIONS:  - Educate patient/family on patient safety including physical limitations  - Instruct patient to call for assistance with activity (min assist)  - Consult OT/PT to assist with strengthening/mobility   - Keep Call bell within reach  - Keep bed low and locked with side rails adjusted as appropriate  - Keep care items and personal belongings within reach  - Initiate and maintain comfort rounds  - Make Fall Risk Sign visible to staff (high fall risk)  - Offer Toileting every 1-2 Hours, in advance of need  - Initiate/Maintain bed/chair alarm  - Obtain necessary fall risk management equipment: nonskid footwear, call bell within reach  - Apply yellow socks and bracelet for high fall risk patients  - Consider moving patient to room near nurses station  Outcome: Progressing     Problem: Prexisting or High Potential for Compromised Skin Integrity  Goal: Skin integrity is maintained or improved  Description: INTERVENTIONS:  - Identify patients at risk for skin breakdown  - Assess and monitor skin integrity  - Assess and monitor nutrition and hydration status  - Monitor labs   - Assess for incontinence (every 1-2 hours and prn)  - Turn and reposition patient (every 2 hours and prn)  - Assist with mobility/ambulation (min assist)  - Relieve pressure over bony prominences  - Avoid friction and shearing  - Provide appropriate hygiene as needed including keeping skin clean and dry  - Evaluate need for skin moisturizer/barrier cream  - Collaborate with interdisciplinary team   - Patient/family teaching  Outcome: Progressing     Problem: MOBILITY - ADULT  Goal: Maintain or return to baseline ADL function  Description: INTERVENTIONS:  -  Assess patient's ability to carry out ADLs; (mod to max assist)  - Assess/evaluate cause of self-care deficits (confusion, fatigue, limited movement)  - Assess range of motion  - Assess patient's mobility; (min assist)  - Assess patient's need for assistive devices and provide as appropriate  - Encourage maximum independence but intervene and supervise when necessary  - Involve family in performance of ADLs  - Assess for home care needs following discharge   - Consider OT consult to assist with ADL evaluation and planning for discharge  - Provide patient education as appropriate  Outcome: Progressing  Goal: Maintains/Returns to pre admission functional level  Description: INTERVENTIONS:  - Perform BMAT or MOVE assessment daily    - Set and communicate daily mobility goal to care team and patient/family/caregiver  - Collaborate with rehabilitation services on mobility goals if consulted  - Perform Range of Motion 3-4 times a day  - Reposition patient every 1-2 hours    - Dangle patient 3-4 times a day  - Stand patient 3-4 times a day  - Ambulate patient 3-4 times a day  - Out of bed to chair 3-4 times a day   - Out of bed for meals 3-4 times a day  - Out of bed for toileting  - Record patient progress and toleration of activity level   Outcome: Progressing     Problem: GASTROINTESTINAL - ADULT  Goal: Maintains or returns to baseline bowel function  Description: INTERVENTIONS:  - Assess bowel function  - Encourage oral fluids to ensure adequate hydration  - Administer IV fluids if ordered to ensure adequate hydration  - Administer ordered medications as needed  - Encourage mobilization and activity  - Consider nutritional services referral to assist patient with adequate nutrition and appropriate food choices  Outcome: Progressing  Goal: Maintains adequate nutritional intake  Description: INTERVENTIONS:  - Monitor percentage of each meal consumed  - Identify factors contributing to decreased intake, treat as appropriate  - Assist with meals as needed  - Monitor I&O, weight, and lab values if indicated  - Obtain nutrition services referral as needed  Outcome: Progressing

## 2022-10-29 NOTE — PROGRESS NOTES
Progress Note - General Surgery   Dearl Dirk 80 y o  male MRN: [id number]  Unit/Bed#: 172-69 Encounter: [id number]    Assessment:  Pleasant demented WM, OOB to chair  Offers no complaints  AF, /100  Sepsis appears resolved    Plan:  Advance diet as tolerated    Subjective/Objective   Chief Complaint: Offers no complaints    Subjective: Appears comfortable, OOB to chair    Objective:     Blood pressure (!) 173/100, pulse 62, temperature 97 9 °F (36 6 °C), resp  rate 20, height 5' 10" (1 778 m), weight 72 2 kg (159 lb 2 8 oz), SpO2 98 %  ,Body mass index is 22 84 kg/m²        Intake/Output Summary (Last 24 hours) at 10/29/2022 0831  Last data filed at 10/29/2022 0501  Gross per 24 hour   Intake 120 ml   Output 3275 ml   Net -3155 ml       Invasive Devices  Report    Peripheral Intravenous Line  Duration           Peripheral IV 10/28/22 Right Antecubital 1 day                Physical Exam:   Abd:   Soft, non-tender with hypoactive BS    Lab, Imaging and other studies:  CBC:   Lab Results   Component Value Date    WBC 6 82 10/29/2022    HGB 13 4 10/29/2022    HCT 40 1 10/29/2022    MCV 92 10/29/2022     10/29/2022    MCH 30 8 10/29/2022    MCHC 33 4 10/29/2022    RDW 13 1 10/29/2022    MPV 10 4 10/29/2022   , CMP:   Lab Results   Component Value Date    SODIUM 143 10/29/2022    K 3 4 (L) 10/29/2022     10/29/2022    CO2 27 10/29/2022    BUN 8 10/29/2022    CREATININE 0 98 10/29/2022    CALCIUM 8 9 10/29/2022    AST 21 10/29/2022    ALT 15 10/29/2022    ALKPHOS 65 10/29/2022    EGFR 68 10/29/2022     VTE Pharmacologic Prophylaxis: Heparin  VTE Mechanical Prophylaxis: Per primary service

## 2022-10-29 NOTE — ASSESSMENT & PLAN NOTE
Acute metabolic encephalopathy, present on admission, as evidenced by increased confusion per the nursing facility, transient hypotension, patient does have underlying dementia

## 2022-10-29 NOTE — ASSESSMENT & PLAN NOTE
· Lactate 6 1 on admit, now 2 0 s/p 2L fluid bolus resuscitation  · Likely 2/2 hypovolemia, resolved

## 2022-10-30 LAB
ALBUMIN SERPL BCP-MCNC: 3.1 G/DL (ref 3.5–5)
ALP SERPL-CCNC: 64 U/L (ref 46–116)
ALT SERPL W P-5'-P-CCNC: 13 U/L (ref 12–78)
ANION GAP SERPL CALCULATED.3IONS-SCNC: 8 MMOL/L (ref 4–13)
AST SERPL W P-5'-P-CCNC: 25 U/L (ref 5–45)
BASOPHILS # BLD AUTO: 0.03 THOUSANDS/ÂΜL (ref 0–0.1)
BASOPHILS NFR BLD AUTO: 0 % (ref 0–1)
BILIRUB SERPL-MCNC: 0.59 MG/DL (ref 0.2–1)
BUN SERPL-MCNC: 7 MG/DL (ref 5–25)
CALCIUM ALBUM COR SERPL-MCNC: 9.6 MG/DL (ref 8.3–10.1)
CALCIUM SERPL-MCNC: 8.9 MG/DL (ref 8.3–10.1)
CHLORIDE SERPL-SCNC: 104 MMOL/L (ref 96–108)
CO2 SERPL-SCNC: 30 MMOL/L (ref 21–32)
CREAT SERPL-MCNC: 1.02 MG/DL (ref 0.6–1.3)
EOSINOPHIL # BLD AUTO: 0.2 THOUSAND/ÂΜL (ref 0–0.61)
EOSINOPHIL NFR BLD AUTO: 3 % (ref 0–6)
ERYTHROCYTE [DISTWIDTH] IN BLOOD BY AUTOMATED COUNT: 13.1 % (ref 11.6–15.1)
GFR SERPL CREATININE-BSD FRML MDRD: 65 ML/MIN/1.73SQ M
GLUCOSE SERPL-MCNC: 88 MG/DL (ref 65–140)
HCT VFR BLD AUTO: 42.1 % (ref 36.5–49.3)
HGB BLD-MCNC: 14 G/DL (ref 12–17)
IMM GRANULOCYTES # BLD AUTO: 0.02 THOUSAND/UL (ref 0–0.2)
IMM GRANULOCYTES NFR BLD AUTO: 0 % (ref 0–2)
INR PPP: 1.18 (ref 0.84–1.19)
LYMPHOCYTES # BLD AUTO: 1.24 THOUSANDS/ÂΜL (ref 0.6–4.47)
LYMPHOCYTES NFR BLD AUTO: 16 % (ref 14–44)
MAGNESIUM SERPL-MCNC: 1.8 MG/DL (ref 1.6–2.6)
MCH RBC QN AUTO: 30.5 PG (ref 26.8–34.3)
MCHC RBC AUTO-ENTMCNC: 33.3 G/DL (ref 31.4–37.4)
MCV RBC AUTO: 92 FL (ref 82–98)
MONOCYTES # BLD AUTO: 0.97 THOUSAND/ÂΜL (ref 0.17–1.22)
MONOCYTES NFR BLD AUTO: 12 % (ref 4–12)
NEUTROPHILS # BLD AUTO: 5.36 THOUSANDS/ÂΜL (ref 1.85–7.62)
NEUTS SEG NFR BLD AUTO: 69 % (ref 43–75)
NRBC BLD AUTO-RTO: 0 /100 WBCS
PHOSPHATE SERPL-MCNC: 3.8 MG/DL (ref 2.3–4.1)
PLATELET # BLD AUTO: 167 THOUSANDS/UL (ref 149–390)
PMV BLD AUTO: 10.4 FL (ref 8.9–12.7)
POTASSIUM SERPL-SCNC: 3.5 MMOL/L (ref 3.5–5.3)
PROCALCITONIN SERPL-MCNC: <0.05 NG/ML
PROT SERPL-MCNC: 6.4 G/DL (ref 6.4–8.4)
PROTHROMBIN TIME: 15.2 SECONDS (ref 11.6–14.5)
RBC # BLD AUTO: 4.59 MILLION/UL (ref 3.88–5.62)
SODIUM SERPL-SCNC: 142 MMOL/L (ref 135–147)
WBC # BLD AUTO: 7.82 THOUSAND/UL (ref 4.31–10.16)

## 2022-10-30 RX ORDER — HALOPERIDOL 5 MG/ML
2 INJECTION INTRAMUSCULAR EVERY 6 HOURS PRN
Status: DISCONTINUED | OUTPATIENT
Start: 2022-10-30 | End: 2022-11-04 | Stop reason: HOSPADM

## 2022-10-30 RX ORDER — AMOXICILLIN 250 MG
1 CAPSULE ORAL
Status: DISCONTINUED | OUTPATIENT
Start: 2022-10-30 | End: 2022-11-04 | Stop reason: HOSPADM

## 2022-10-30 RX ADMIN — LABETALOL HYDROCHLORIDE 10 MG: 5 INJECTION, SOLUTION INTRAVENOUS at 21:43

## 2022-10-30 RX ADMIN — SENNOSIDES AND DOCUSATE SODIUM 1 TABLET: 50; 8.6 TABLET ORAL at 21:38

## 2022-10-30 RX ADMIN — METRONIDAZOLE 500 MG: 5 INJECTION, SOLUTION INTRAVENOUS at 04:02

## 2022-10-30 RX ADMIN — PRAVASTATIN SODIUM 40 MG: 40 TABLET ORAL at 21:38

## 2022-10-30 RX ADMIN — VANCOMYCIN HYDROCHLORIDE 750 MG: 750 INJECTION, POWDER, LYOPHILIZED, FOR SOLUTION INTRAVENOUS at 10:36

## 2022-10-30 RX ADMIN — HEPARIN SODIUM 5000 UNITS: 5000 INJECTION INTRAVENOUS; SUBCUTANEOUS at 21:38

## 2022-10-30 RX ADMIN — HEPARIN SODIUM 5000 UNITS: 5000 INJECTION INTRAVENOUS; SUBCUTANEOUS at 10:36

## 2022-10-30 RX ADMIN — CEFTRIAXONE 1000 MG: 1 INJECTION, SOLUTION INTRAVENOUS at 00:46

## 2022-10-30 RX ADMIN — QUETIAPINE FUMARATE 50 MG: 25 TABLET ORAL at 21:38

## 2022-10-30 RX ADMIN — DONEPEZIL HYDROCHLORIDE 10 MG: 5 TABLET ORAL at 21:38

## 2022-10-30 RX ADMIN — METRONIDAZOLE 500 MG: 5 INJECTION, SOLUTION INTRAVENOUS at 12:07

## 2022-10-30 RX ADMIN — TRAZODONE HYDROCHLORIDE 50 MG: 50 TABLET ORAL at 21:38

## 2022-10-30 RX ADMIN — METRONIDAZOLE 500 MG: 5 INJECTION, SOLUTION INTRAVENOUS at 18:09

## 2022-10-30 RX ADMIN — DIVALPROEX SODIUM 125 MG: 125 CAPSULE, COATED PELLETS ORAL at 21:38

## 2022-10-30 RX ADMIN — VANCOMYCIN HYDROCHLORIDE 750 MG: 750 INJECTION, POWDER, LYOPHILIZED, FOR SOLUTION INTRAVENOUS at 21:38

## 2022-10-30 NOTE — PLAN OF CARE
Problem: PAIN - ADULT  Goal: Verbalizes/displays adequate comfort level or baseline comfort level  Description: Interventions:  - Encourage patient to monitor pain and request assistance  - Assess pain using appropriate pain scale (0-10 pain scale)  - Administer analgesics based on type and severity of pain and evaluate response  - Implement non-pharmacological measures as appropriate and evaluate response  - Consider cultural and social influences on pain and pain management  - Notify physician/advanced practitioner if interventions unsuccessful or patient reports new pain  Outcome: Progressing     Problem: INFECTION - ADULT  Goal: Absence or prevention of progression during hospitalization  Description: INTERVENTIONS:  - Assess and monitor for signs and symptoms of infection  - Monitor lab/diagnostic results  - Monitor all insertion sites, i e  indwelling lines  - Administer medications as ordered  - Instruct and encourage patient and family to use good hand hygiene technique  Outcome: Progressing     Problem: SAFETY ADULT  Goal: Patient will remain free of falls  Description: INTERVENTIONS:  - Educate patient/family on patient safety including physical limitations  - Instruct patient to call for assistance with activity (min assist)  - Consult OT/PT to assist with strengthening/mobility   - Keep Call bell within reach  - Keep bed low and locked with side rails adjusted as appropriate  - Keep care items and personal belongings within reach  - Initiate and maintain comfort rounds  - Make Fall Risk Sign visible to staff (high fall risk)  - Offer Toileting every 1-2 Hours, in advance of need  - Initiate/Maintain bed/chair alarm  - Obtain necessary fall risk management equipment: nonskid footwear, call bell within reach  - Apply yellow socks and bracelet for high fall risk patients  - Consider moving patient to room near nurses station  Outcome: Progressing  Goal: Maintain or return to baseline ADL function  Description: INTERVENTIONS:  -  Assess patient's ability to carry out ADLs; (mod to max assist)  - Assess/evaluate cause of self-care deficits (confusion, fatigue, limited movement)  - Assess range of motion  - Assess patient's mobility; (min assist)  - Assess patient's need for assistive devices and provide as appropriate  - Encourage maximum independence but intervene and supervise when necessary  - Involve family in performance of ADLs  - Assess for home care needs following discharge   - Consider OT consult to assist with ADL evaluation and planning for discharge  - Provide patient education as appropriate  Outcome: Progressing  Goal: Maintains/Returns to pre admission functional level  Description: INTERVENTIONS:  - Perform BMAT or MOVE assessment daily    - Set and communicate daily mobility goal to care team and patient/family/caregiver  - Collaborate with rehabilitation services on mobility goals if consulted  - Perform Range of Motion 3-4 times a day  - Reposition patient every 1-2 hours    - Dangle patient 3-4 times a day  - Stand patient 3-4 times a day  - Ambulate patient 3-4 times a day  - Out of bed to chair 3-4 times a day   - Out of bed for meals 3-4 times a day  - Out of bed for toileting  - Record patient progress and toleration of activity level   Outcome: Progressing     Problem: DISCHARGE PLANNING  Goal: Discharge to home or other facility with appropriate resources  Description: INTERVENTIONS:  - Identify barriers to discharge w/patient and caregiver  - Arrange for needed discharge resources and transportation as appropriate  - Identify discharge learning needs (meds, wound care, etc )  - Refer to Case Management Department for coordinating discharge planning if the patient needs post-hospital services based on physician/advanced practitioner order or complex needs related to functional status, cognitive ability, or social support system  Outcome: Progressing     Problem: Knowledge Deficit  Goal: Patient/family/caregiver demonstrates understanding of disease process, treatment plan, medications, and discharge instructions  Description: Complete learning assessment and assess knowledge base    Interventions:  - Provide teaching at level of understanding  - Provide teaching via preferred learning methods  Outcome: Progressing     Problem: Potential for Falls  Goal: Patient will remain free of falls  Description: INTERVENTIONS:  - Educate patient/family on patient safety including physical limitations  - Instruct patient to call for assistance with activity (min assist)  - Consult OT/PT to assist with strengthening/mobility   - Keep Call bell within reach  - Keep bed low and locked with side rails adjusted as appropriate  - Keep care items and personal belongings within reach  - Initiate and maintain comfort rounds  - Make Fall Risk Sign visible to staff (high fall risk)  - Offer Toileting every 1-2 Hours, in advance of need  - Initiate/Maintain bed/chair alarm  - Obtain necessary fall risk management equipment: nonskid footwear, call bell within reach  - Apply yellow socks and bracelet for high fall risk patients  - Consider moving patient to room near nurses station  Outcome: Progressing     Problem: Prexisting or High Potential for Compromised Skin Integrity  Goal: Skin integrity is maintained or improved  Description: INTERVENTIONS:  - Identify patients at risk for skin breakdown  - Assess and monitor skin integrity  - Assess and monitor nutrition and hydration status  - Monitor labs   - Assess for incontinence (every 1-2 hours and prn)  - Turn and reposition patient (every 2 hours and prn)  - Assist with mobility/ambulation (min assist)  - Relieve pressure over bony prominences  - Avoid friction and shearing  - Provide appropriate hygiene as needed including keeping skin clean and dry  - Evaluate need for skin moisturizer/barrier cream  - Collaborate with interdisciplinary team   - Patient/family teaching  Outcome: Progressing     Problem: MOBILITY - ADULT  Goal: Maintain or return to baseline ADL function  Description: INTERVENTIONS:  -  Assess patient's ability to carry out ADLs; (mod to max assist)  - Assess/evaluate cause of self-care deficits (confusion, fatigue, limited movement)  - Assess range of motion  - Assess patient's mobility; (min assist)  - Assess patient's need for assistive devices and provide as appropriate  - Encourage maximum independence but intervene and supervise when necessary  - Involve family in performance of ADLs  - Assess for home care needs following discharge   - Consider OT consult to assist with ADL evaluation and planning for discharge  - Provide patient education as appropriate  Outcome: Progressing  Goal: Maintains/Returns to pre admission functional level  Description: INTERVENTIONS:  - Perform BMAT or MOVE assessment daily    - Set and communicate daily mobility goal to care team and patient/family/caregiver  - Collaborate with rehabilitation services on mobility goals if consulted  - Perform Range of Motion 3-4 times a day  - Reposition patient every 1-2 hours    - Dangle patient 3-4 times a day  - Stand patient 3-4 times a day  - Ambulate patient 3-4 times a day  - Out of bed to chair 3-4 times a day   - Out of bed for meals 3-4 times a day  - Out of bed for toileting  - Record patient progress and toleration of activity level   Outcome: Progressing     Problem: GASTROINTESTINAL - ADULT  Goal: Maintains or returns to baseline bowel function  Description: INTERVENTIONS:  - Assess bowel function  - Encourage oral fluids to ensure adequate hydration  - Administer IV fluids if ordered to ensure adequate hydration  - Administer ordered medications as needed  - Encourage mobilization and activity  - Consider nutritional services referral to assist patient with adequate nutrition and appropriate food choices  Outcome: Progressing  Goal: Maintains adequate nutritional intake  Description: INTERVENTIONS:  - Monitor percentage of each meal consumed  - Identify factors contributing to decreased intake, treat as appropriate  - Assist with meals as needed  - Monitor I&O, weight, and lab values if indicated  - Obtain nutrition services referral as needed  Outcome: Progressing     Problem: SAFETY,RESTRAINT: NV/NON-SELF DESTRUCTIVE BEHAVIOR  Goal: Remains free of harm/injury (restraint for non violent/non self-detsructive behavior)  Description: INTERVENTIONS:  - Instruct patient/family regarding restraint use   - Assess and monitor physiologic and psychological status   - Provide interventions and comfort measures to meet assessed patient needs   - Identify and implement measures to help patient regain control  - Assess readiness for release of restraint   Outcome: Progressing  Goal: Returns to optimal restraint-free functioning  Description: INTERVENTIONS:  - Assess the patient's behavior and symptoms that indicate continued need for restraint  - Identify and implement measures to help patient regain control  - Assess readiness for release of restraint   Outcome: Progressing

## 2022-10-30 NOTE — ASSESSMENT & PLAN NOTE
Acute metabolic encephalopathy, present on admission, as evidenced by increased confusion per the nursing facility, transient hypotension, patient does have underlying dementia    -Haloperidol prn for aggression  -Virtual observer in place

## 2022-10-30 NOTE — ASSESSMENT & PLAN NOTE
· H&P listed sepsis as one of the primary diagnoses, SIRS criteria has not presented itself so Acute Colitis is the working primary diagnosis at this time    · CT abd/pelvis showed acute colitis as likely infectious source  · Continue ceftriaxone and Flagyl due to positive blood culture results  · Possible ischemic colitis versus infectious colitis  · Bcxs growing G+ rods, G+ cocci in pairs & chains, likely contaminants given the multitudinous types of bacteria resulting, but cannot rule out bacteremia at this time  · Obtained 2nd set of blood cultures 10/30/22  · Acute care surgery and GI consulted, will appreciate recs  · Diet: regular

## 2022-10-30 NOTE — NURSING NOTE
Pt expressing discomfort while sitting, due to pt being impacted  Nurse attempted digital removal/disimpaction with success  Pt had multiple small harden balls of stool removed  After removal pt states they feel more comfortable

## 2022-10-30 NOTE — PROGRESS NOTES
[address] [facility] [address]  Progress Note - Moon Proper 11/7/1933, 80 y o  male MRN: [id number]  Unit/Bed#: 068-80 Encounter: [id number]  Primary Care Provider: Joaquin Henao DO   Date and time admitted to hospital: 10/27/2022 10:38 PM    * Acute colitis  Assessment & Plan  · H&P listed sepsis as one of the primary diagnoses, SIRS criteria has not presented itself so Acute Colitis is the working primary diagnosis at this time  · CT abd/pelvis showed acute colitis as likely infectious source  · Continue ceftriaxone and Flagyl due to positive blood culture results  · Possible ischemic colitis versus infectious colitis  · Bcxs growing G+ rods, G+ cocci in pairs & chains, likely contaminants given the multitudinous types of bacteria resulting, but cannot rule out bacteremia at this time  · Obtained 2nd set of blood cultures 10/30/22  · Acute care surgery and GI consulted, will appreciate recs  · Diet: regular    Acute delirium  Assessment & Plan  Acute metabolic encephalopathy, present on admission, as evidenced by increased confusion per the nursing facility, transient hypotension, patient does have underlying dementia    -Haloperidol prn for aggression  -Virtual observer in place    Severe dementia with agitation  Assessment & Plan  See acute delirium A&P    Acute kidney injury [facility])  Assessment & Plan  Acute kidney injury was present on admission, resolved  -Creatinine 1 02    Primary hypertension  Assessment & Plan  · Norvasc 2 5 mg daily, continue to hold irbesartan    Hypokalemia  Assessment & Plan  · K+ today 3 5  · Monitor potassium level and replace as needed      Lactic acidosis  Assessment & Plan  · Lactate 6 1 on admit, most recent lactate 2 0 s/p 2L fluid bolus resuscitation  · Likely 2/2 hypovolemia, resolved      VTE Pharmacologic Prophylaxis:   Pharmacologic: Heparin  Mechanical VTE Prophylaxis in Place: Yes    Patient Centered Rounds: I have performed bedside rounds with nursing staff today     Discussions with Specialists or Other Care Team Provider: no    Education and Discussions with Family / Patient: Discussed plan with patient  Dr Mariajose Cardoza discussed with family yesterday  Time Spent for Care: 30 minutes  More than 50% of total time spent on counseling and coordination of care as described above  Current Length of Stay: 2 day(s)    Current Patient Status: Inpatient   Certification Statement: The patient will continue to require additional inpatient hospital stay due to need for antibiotics    Discharge Plan: 1-2 days    Code Status: Level 3 - DNAR and DNI      Subjective:   Overnight had episode of aggression requiring haloperidol for sedation, nonviolent restraints on currently and virtual observer in place  Ceftriaxone day 3 metronidazole day 3  Today the patient states he is “not so good” because he thinks someone is trying to poison him  I assured him we would prevent that from happening  Voices no new pains concerns or symptoms  Objective:     Vitals:   Temp (24hrs), Av 1 °F (36 2 °C), Min:96 4 °F (35 8 °C), Max:97 7 °F (36 5 °C)    Temp:  [96 4 °F (35 8 °C)-97 7 °F (36 5 °C)] 96 4 °F (35 8 °C)  HR:  [58-72] 58  Resp:  [14-20] 14  BP: (160-166)/(93-98) 166/98  SpO2:  [96 %-98 %] 97 %  Body mass index is 22 84 kg/m²  Input and Output Summary (last 24 hours): Intake/Output Summary (Last 24 hours) at 10/30/2022 1135  Last data filed at 10/29/2022 2306  Gross per 24 hour   Intake 300 ml   Output 1000 ml   Net -700 ml       Physical Exam:     Physical Exam  Vitals reviewed  Constitutional:       General: He is not in acute distress  Appearance: Normal appearance  He is normal weight  He is not ill-appearing  HENT:      Head: Normocephalic  Nose: No rhinorrhea  Eyes:      General: No scleral icterus  Right eye: No discharge  Left eye: No discharge  Cardiovascular:      Rate and Rhythm: Normal rate and regular rhythm        Pulses: Normal pulses  Heart sounds: Normal heart sounds  No murmur heard  No friction rub  No gallop  Pulmonary:      Effort: Pulmonary effort is normal  No respiratory distress  Breath sounds: Normal breath sounds  No wheezing, rhonchi or rales  Abdominal:      General: Abdomen is flat  Bowel sounds are normal  There is no distension  Palpations: Abdomen is soft  There is no mass  Tenderness: There is no abdominal tenderness  There is no guarding or rebound  Musculoskeletal:         General: No swelling  Cervical back: Neck supple  Skin:     General: Skin is warm and dry  Neurological:      Mental Status: He is alert  Psychiatric:      Comments: Appears upset but does not display agitation at this time  Additional Data:     Labs:    Results from last 7 days   Lab Units 10/30/22  0457   WBC Thousand/uL 7 82   HEMOGLOBIN g/dL 14 0   HEMATOCRIT % 42 1   PLATELETS Thousands/uL 167   NEUTROS PCT % 69   LYMPHS PCT % 16   MONOS PCT % 12   EOS PCT % 3     Results from last 7 days   Lab Units 10/30/22  0457   SODIUM mmol/L 142   POTASSIUM mmol/L 3 5   CHLORIDE mmol/L 104   CO2 mmol/L 30   BUN mg/dL 7   CREATININE mg/dL 1 02   ANION GAP mmol/L 8   CALCIUM mg/dL 8 9   ALBUMIN g/dL 3 1*   TOTAL BILIRUBIN mg/dL 0 59   ALK PHOS U/L 64   ALT U/L 13   AST U/L 25   GLUCOSE RANDOM mg/dL 88     Results from last 7 days   Lab Units 10/30/22  0457   INR  1 18             Results from last 7 days   Lab Units 10/30/22  0457 10/28/22  0524 10/28/22  0142   LACTIC ACID mmol/L  --  2 0 6 1*   PROCALCITONIN ng/ml <0 05  --   --            * I Have Reviewed All Lab Data Listed Above  * Additional Pertinent Lab Tests Reviewed:  All Labs Within Last 24 Hours Reviewed    Imaging:    Imaging Reports Reviewed Today Include: no new  Imaging Personally Reviewed by Myself Includes:  N/A    Recent Cultures (last 7 days):     Results from last 7 days   Lab Units 10/28/22  0138   GRAM STAIN RESULT  Gram positive cocci in pairs*  Gram positive rods*  Gram positive cocci in pairs and chains*       Last 24 Hours Medication List:   Current Facility-Administered Medications   Medication Dose Route Frequency Provider Last Rate   • acetaminophen  650 mg Oral Q6H PRN Renea Rodriguez MD     • amLODIPine  2 5 mg Oral Daily Jame Mcintyre MD     • divalproex sodium  125 mg Oral Q12H [address] Jame Mcintyre MD     • donepezil  10 mg Oral HS Renea Rodriguez MD     • haloperidol lactate  2 mg Intramuscular Q6H PRN Irena Petersonol, DO     • heparin (porcine)  5,000 Units Subcutaneous Q12H [address] Renea Rodriguez MD     • iohexol  100 mL Intravenous Once in imaging Renea Rodriguez MD     • labetalol  10 mg Intravenous Q6H PRN Irena Petersonol, DO     • memantine  5 mg Oral Daily Renea Rodriguez MD     • metroNIDAZOLE  500 mg Intravenous Q8H Renea Rodriguez  mg (10/30/22 0402)   • ondansetron  4 mg Intravenous Q6H PRN Renea Rodriguez MD     • pantoprazole  40 mg Oral Daily Jame Mcintyre MD     • pravastatin  40 mg Oral HS Renea Rodriguez MD     • QUEtiapine  25 mg Oral Early Morning Renea Rodriguez MD     • QUEtiapine  50 mg Oral HS Renea Rodriguez MD     • traZODone  50 mg Oral HS Renea Rodriguez MD     • vancomycin  10 mg/kg Intravenous Q12H Irena Plata,  mg (10/30/22 1036)        Today, Patient Was Seen By: Jame Mcintyre    ** Please Note: Dictation voice to text software may have been used in the creation of this document   **

## 2022-10-30 NOTE — PROGRESS NOTES
Vancomycin Assessment    Cherylene Mcbride is a 80 y o  male who is currently receiving vancomycin 750mg q12h for bacteremia     Relevant clinical data and objective history reviewed:  Creatinine   Date Value Ref Range Status   10/30/2022 1 02 0 60 - 1 30 mg/dL Final     Comment:     Standardized to IDMS reference method   10/29/2022 0 98 0 60 - 1 30 mg/dL Final     Comment:     Standardized to IDMS reference method   10/28/2022 1 06 0 60 - 1 30 mg/dL Final     Comment:     Standardized to IDMS reference method     /93 (BP Location: Right arm)   Pulse 72   Temp 97 7 °F (36 5 °C) (Oral)   Resp 20   Ht 5' 10" (1 778 m)   Wt 72 2 kg (159 lb 2 8 oz)   SpO2 96%   BMI 22 84 kg/m²   I/O last 3 completed shifts: In: 720 [P O :720]  Out: 4150 [Urine:4150]  Lab Results   Component Value Date/Time    BUN 7 10/30/2022 04:57 AM    WBC 7 82 10/30/2022 04:57 AM    HGB 14 0 10/30/2022 04:57 AM    HCT 42 1 10/30/2022 04:57 AM    MCV 92 10/30/2022 04:57 AM     10/30/2022 04:57 AM     Temp Readings from Last 3 Encounters:   10/29/22 97 7 °F (36 5 °C) (Oral)   10/25/22 98 8 °F (37 1 °C) (Temporal)   11/23/21 98 6 °F (37 °C) (Tympanic)     Vancomycin Days of Therapy: 1    Assessment/Plan  The patient is currently on vancomycin utilizing scheduled dosing based on actual body weight  Baseline risks associated with therapy include: pre-existing renal impairment, advanced age, and dehydration  The patient is currently receiving 750mg q12h and is clinically appropriate and dose will be continued  Pharmacy will also follow closely for s/sx of nephrotoxicity, infusion reactions, and appropriateness of therapy  BMP and CBC will be ordered per protocol  Plan for trough as patient approaches steady state, prior to the 5th  dose at approximately 0830 on 11/1/22  Due to infection severity, will target a trough of 15-20 (appropriate for most indications)     Pharmacy will continue to follow the patient’s culture results and clinical progress daily      Jonelle May, Pharmacist

## 2022-10-30 NOTE — PROGRESS NOTES
Progress Note - General Surgery   Genie Starch 80 y o  male MRN: [id number]  Unit/Bed#: 441-63 Encounter: [id number]    Assessment:  Appears more alert  Answers direct questions ie How are you? "not so good" Are you having pain ? "I guess not" Is this tender? "No"  Tolerating diet  VSS AF  Abdomen soft, non-tender with active bowel sounds    Plan:  Continue Vancomycin / Flagyl  Final BC results pending  Consider diagnostic colonoscopy  Subjective/Objective   Chief Complaint: Offers no complaints    Subjective: Comfortable, lying in bed, with soft restraints    Objective:     Blood pressure 166/98, pulse 58, temperature (!) 96 4 °F (35 8 °C), temperature source Tympanic, resp  rate 14, height 5' 10" (1 778 m), weight 72 2 kg (159 lb 2 8 oz), SpO2 97 %  ,Body mass index is 22 84 kg/m²  Intake/Output Summary (Last 24 hours) at 10/30/2022 1340  Last data filed at 10/30/2022 1233  Gross per 24 hour   Intake 0 ml   Output 1000 ml   Net -1000 ml       Invasive Devices  Report    Peripheral Intravenous Line  Duration           Peripheral IV 10/29/22 Left;Proximal;Ventral (anterior) Forearm <1 day                Physical Exam:   Abdomen soft, protuberant, non tender, no mass or organomegaly   Bowel sounds normal     Lab, Imaging and other studies:  CBC:   Lab Results   Component Value Date    WBC 7 82 10/30/2022    HGB 14 0 10/30/2022    HCT 42 1 10/30/2022    MCV 92 10/30/2022     10/30/2022    MCH 30 5 10/30/2022    MCHC 33 3 10/30/2022    RDW 13 1 10/30/2022    MPV 10 4 10/30/2022    NRBC 0 10/30/2022   , CMP:   Lab Results   Component Value Date    SODIUM 142 10/30/2022    K 3 5 10/30/2022     10/30/2022    CO2 30 10/30/2022    BUN 7 10/30/2022    CREATININE 1 02 10/30/2022    CALCIUM 8 9 10/30/2022    AST 25 10/30/2022    ALT 13 10/30/2022    ALKPHOS 64 10/30/2022    EGFR 65 10/30/2022     VTE Pharmacologic Prophylaxis: Heparin  VTE Mechanical Prophylaxis: sequential compression device

## 2022-10-30 NOTE — ASSESSMENT & PLAN NOTE
· Lactate 6 1 on admit, most recent lactate 2 0 s/p 2L fluid bolus resuscitation  · Likely 2/2 hypovolemia, resolved

## 2022-10-31 LAB
ANION GAP SERPL CALCULATED.3IONS-SCNC: 8 MMOL/L (ref 4–13)
BASOPHILS # BLD AUTO: 0.04 THOUSANDS/ÂΜL (ref 0–0.1)
BASOPHILS NFR BLD AUTO: 1 % (ref 0–1)
BUN SERPL-MCNC: 10 MG/DL (ref 5–25)
CALCIUM SERPL-MCNC: 8.5 MG/DL (ref 8.3–10.1)
CHLORIDE SERPL-SCNC: 108 MMOL/L (ref 96–108)
CO2 SERPL-SCNC: 26 MMOL/L (ref 21–32)
CREAT SERPL-MCNC: 0.97 MG/DL (ref 0.6–1.3)
EOSINOPHIL # BLD AUTO: 0.18 THOUSAND/ÂΜL (ref 0–0.61)
EOSINOPHIL NFR BLD AUTO: 3 % (ref 0–6)
ERYTHROCYTE [DISTWIDTH] IN BLOOD BY AUTOMATED COUNT: 13.1 % (ref 11.6–15.1)
GFR SERPL CREATININE-BSD FRML MDRD: 69 ML/MIN/1.73SQ M
GLUCOSE SERPL-MCNC: 94 MG/DL (ref 65–140)
HCT VFR BLD AUTO: 40.7 % (ref 36.5–49.3)
HGB BLD-MCNC: 13.7 G/DL (ref 12–17)
IMM GRANULOCYTES # BLD AUTO: 0.02 THOUSAND/UL (ref 0–0.2)
IMM GRANULOCYTES NFR BLD AUTO: 0 % (ref 0–2)
LYMPHOCYTES # BLD AUTO: 1.38 THOUSANDS/ÂΜL (ref 0.6–4.47)
LYMPHOCYTES NFR BLD AUTO: 20 % (ref 14–44)
MCH RBC QN AUTO: 30.2 PG (ref 26.8–34.3)
MCHC RBC AUTO-ENTMCNC: 33.7 G/DL (ref 31.4–37.4)
MCV RBC AUTO: 90 FL (ref 82–98)
MONOCYTES # BLD AUTO: 0.92 THOUSAND/ÂΜL (ref 0.17–1.22)
MONOCYTES NFR BLD AUTO: 13 % (ref 4–12)
NEUTROPHILS # BLD AUTO: 4.4 THOUSANDS/ÂΜL (ref 1.85–7.62)
NEUTS SEG NFR BLD AUTO: 63 % (ref 43–75)
NRBC BLD AUTO-RTO: 0 /100 WBCS
PLATELET # BLD AUTO: 163 THOUSANDS/UL (ref 149–390)
PMV BLD AUTO: 10.5 FL (ref 8.9–12.7)
POTASSIUM SERPL-SCNC: 3.3 MMOL/L (ref 3.5–5.3)
RBC # BLD AUTO: 4.53 MILLION/UL (ref 3.88–5.62)
SODIUM SERPL-SCNC: 142 MMOL/L (ref 135–147)
WBC # BLD AUTO: 6.94 THOUSAND/UL (ref 4.31–10.16)

## 2022-10-31 RX ORDER — POTASSIUM CHLORIDE 20 MEQ/1
40 TABLET, EXTENDED RELEASE ORAL ONCE
Status: COMPLETED | OUTPATIENT
Start: 2022-10-31 | End: 2022-10-31

## 2022-10-31 RX ORDER — LOSARTAN POTASSIUM 50 MG/1
100 TABLET ORAL DAILY
Status: DISCONTINUED | OUTPATIENT
Start: 2022-11-01 | End: 2022-11-04 | Stop reason: HOSPADM

## 2022-10-31 RX ADMIN — QUETIAPINE FUMARATE 50 MG: 25 TABLET ORAL at 21:15

## 2022-10-31 RX ADMIN — SENNOSIDES AND DOCUSATE SODIUM 1 TABLET: 50; 8.6 TABLET ORAL at 21:16

## 2022-10-31 RX ADMIN — TRAZODONE HYDROCHLORIDE 50 MG: 50 TABLET ORAL at 21:16

## 2022-10-31 RX ADMIN — VANCOMYCIN HYDROCHLORIDE 750 MG: 750 INJECTION, POWDER, LYOPHILIZED, FOR SOLUTION INTRAVENOUS at 09:02

## 2022-10-31 RX ADMIN — POTASSIUM CHLORIDE 40 MEQ: 1500 TABLET, EXTENDED RELEASE ORAL at 21:16

## 2022-10-31 RX ADMIN — METRONIDAZOLE 500 MG: 5 INJECTION, SOLUTION INTRAVENOUS at 02:42

## 2022-10-31 RX ADMIN — MEMANTINE 5 MG: 5 TABLET ORAL at 09:02

## 2022-10-31 RX ADMIN — HEPARIN SODIUM 5000 UNITS: 5000 INJECTION INTRAVENOUS; SUBCUTANEOUS at 09:02

## 2022-10-31 RX ADMIN — HEPARIN SODIUM 5000 UNITS: 5000 INJECTION INTRAVENOUS; SUBCUTANEOUS at 21:15

## 2022-10-31 RX ADMIN — AMLODIPINE BESYLATE 2.5 MG: 2.5 TABLET ORAL at 09:02

## 2022-10-31 RX ADMIN — DONEPEZIL HYDROCHLORIDE 10 MG: 5 TABLET ORAL at 21:15

## 2022-10-31 RX ADMIN — DIVALPROEX SODIUM 125 MG: 125 CAPSULE, COATED PELLETS ORAL at 21:16

## 2022-10-31 RX ADMIN — METRONIDAZOLE 500 MG: 5 INJECTION, SOLUTION INTRAVENOUS at 12:37

## 2022-10-31 RX ADMIN — QUETIAPINE FUMARATE 25 MG: 25 TABLET ORAL at 06:28

## 2022-10-31 RX ADMIN — DIVALPROEX SODIUM 125 MG: 125 CAPSULE, COATED PELLETS ORAL at 09:02

## 2022-10-31 RX ADMIN — HALOPERIDOL LACTATE 2 MG: 5 INJECTION, SOLUTION INTRAMUSCULAR at 22:35

## 2022-10-31 RX ADMIN — VANCOMYCIN HYDROCHLORIDE 750 MG: 750 INJECTION, POWDER, LYOPHILIZED, FOR SOLUTION INTRAVENOUS at 21:16

## 2022-10-31 RX ADMIN — METRONIDAZOLE 500 MG: 5 INJECTION, SOLUTION INTRAVENOUS at 19:41

## 2022-10-31 RX ADMIN — PRAVASTATIN SODIUM 40 MG: 40 TABLET ORAL at 21:16

## 2022-10-31 RX ADMIN — PANTOPRAZOLE SODIUM 40 MG: 40 TABLET, DELAYED RELEASE ORAL at 07:55

## 2022-10-31 NOTE — PROGRESS NOTES
[address] [facility] [address]  Progress Note - Meena Lopez 11/7/1933, 80 y o  male MRN: [id number]  Unit/Bed#: 936-51 Encounter: [id number]  Primary Care Provider: Gal Buenrostro DO   Date and time admitted to hospital: 10/27/2022 10:38 PM    * Acute colitis  Assessment & Plan  · H&P listed sepsis as one of the primary diagnoses, SIRS criteria has not presented itself so Acute Colitis is the working primary diagnosis at this time  · CT abd/pelvis showed acute colitis as likely infectious source  · Continue ceftriaxone and Flagyl due to positive blood culture results  · ID consulted and following  · Possible ischemic colitis versus infectious colitis  · Bcxs growing G+ rods, G+ cocci in pairs & chains, likely contaminants given the multitudinous types of bacteria resulting, but cannot rule out bacteremia at this time  · Obtained 2nd set of blood cultures 10/30/22  · If negative patient likely will be ready for discharge to rehab versus [facility]  · Acute care surgery and GI consulted, will appreciate recs  · Diet: regular    Acute delirium  Assessment & Plan  Acute metabolic encephalopathy, present on admission, as evidenced by increased confusion per the nursing facility, transient hypotension, patient does have underlying dementia  -Haloperidol prn for aggression  -Virtual observer in place  · PT/OT consulted    Severe dementia with agitation  Assessment & Plan  See acute delirium A&P    Acute kidney injury [facility])  Assessment & Plan  Acute kidney injury was present on admission, resolved  -Creatinine 0 97    Primary hypertension  Assessment & Plan  · Norvasc 2 5 mg daily  · Patient required a prn labetalol for severe hypertension  · Therefore, will restart angiotensin receptor blocker    · Irbesartan not on formulary, equivalent losartan daily dose started    Hypokalemia  Assessment & Plan  · K+ today 3 3, will replete and obtain morning BMP      Lactic acidosis  Assessment & Plan  · Lactate 6 1 on admit, most recent lactate 2 0 s/p 2L fluid bolus resuscitation  · Likely 2/2 hypovolemia, resolved      VTE Pharmacologic Prophylaxis:   Pharmacologic: Heparin  Mechanical VTE Prophylaxis in Place: Yes    Patient Centered Rounds: I have performed bedside rounds with nursing staff today  Discussions with Specialists or Other Care Team Provider:  Infectious Disease, appreciate recommendations    Education and Discussions with Family / Patient:  Yes    Time Spent for Care: 30 minutes  More than 50% of total time spent on counseling and coordination of care as described above  Current Length of Stay: 3 day(s)    Current Patient Status: Inpatient   Certification Statement: The patient will continue to require additional inpatient hospital stay due to Gram-positive organism is on blood culture requiring antibiotics and negative repeat blood cultures to rule out infection prior to discharge    Discharge Plan:  1-2 days    Code Status: Level 3 - DNAR and DNI      Subjective:   Vancomycin day 2, metronidazole day 4, ceftriaxone discontinued yesterday after 3 days  Overnight the patient had BP = 171/96 requiring IV labetalol after which it decreased and normalized to his elevated baseline on Norvasc  Had stool disimpaction manually performed yesterday  Today patient is unchanged, voicing no new or worsening symptoms  Objective:     Vitals:   Temp (24hrs), Av 1 °F (36 7 °C), Min:97 9 °F (36 6 °C), Max:98 2 °F (36 8 °C)    Temp:  [97 9 °F (36 6 °C)-98 2 °F (36 8 °C)] 98 2 °F (36 8 °C)  HR:  [61-72] 65  Resp:  [15-20] 15  BP: (149-181)/(88-96) 159/90  SpO2:  [94 %-98 %] 94 %  Body mass index is 23 44 kg/m²  Input and Output Summary (last 24 hours): Intake/Output Summary (Last 24 hours) at 10/31/2022 1628  Last data filed at 10/31/2022 1328  Gross per 24 hour   Intake 540 ml   Output 475 ml   Net 65 ml       Physical Exam:     Physical Exam  Vitals and nursing note reviewed     Constitutional: General: He is not in acute distress  Appearance: Normal appearance  He is well-developed  He is not ill-appearing  HENT:      Head: Normocephalic and atraumatic  Eyes:      General:         Right eye: No discharge  Left eye: No discharge  Conjunctiva/sclera: Conjunctivae normal    Cardiovascular:      Rate and Rhythm: Normal rate and regular rhythm  Pulses: Normal pulses  Heart sounds: Normal heart sounds  No murmur heard  No friction rub  No gallop  Pulmonary:      Effort: Pulmonary effort is normal  No respiratory distress  Breath sounds: Normal breath sounds  Abdominal:      General: Bowel sounds are normal  There is no distension  Palpations: Abdomen is soft  There is no mass  Tenderness: There is no abdominal tenderness  There is no guarding or rebound  Musculoskeletal:         General: No swelling  Cervical back: Neck supple  Skin:     General: Skin is warm and dry  Neurological:      Mental Status: He is alert  Psychiatric:         Mood and Affect: Mood normal       Comments: Baseline dementia without evidence of delirium  Asked him where he got the ring on his finger, he said he does not know but he did not steal it  Capable of answering coherently but with clear memory impairments           Additional Data:     Labs:    Results for orders placed or performed during the hospital encounter of 10/27/22   Blood culture    Specimen: Arm, Right; Blood   Result Value Ref Range    Blood Culture Staphylococcus coagulase negative (A)     Gram Stain Result Gram positive rods (A)     Gram Stain Result Gram positive cocci in pairs and chains (A)    Blood culture    Specimen: Arm, Left; Blood   Result Value Ref Range    Gram Stain Result Gram positive cocci in pairs (A)    MRSA culture    Specimen: Nose; Nares   Result Value Ref Range    MRSA Culture Only       No Methicillin Resistant Staphlyococcus aureus (MRSA) isolated   Blood Culture Identification Panel    Specimen: Arm, Right; Blood   Result Value Ref Range    Staphylococcus Detected (A) Not Detected   Blood culture    Specimen: Arm, Right; Blood   Result Value Ref Range    Blood Culture No Growth at 24 hrs  Blood culture    Specimen: Arm, Left; Blood   Result Value Ref Range    Blood Culture No Growth at 24 hrs      CBC and differential   Result Value Ref Range    WBC 11 84 (H) 4 31 - 10 16 Thousand/uL    RBC 4 79 3 88 - 5 62 Million/uL    Hemoglobin 14 8 12 0 - 17 0 g/dL    Hematocrit 45 5 36 5 - 49 3 %    MCV 95 82 - 98 fL    MCH 30 9 26 8 - 34 3 pg    MCHC 32 5 31 4 - 37 4 g/dL    RDW 13 4 11 6 - 15 1 %    MPV 10 4 8 9 - 12 7 fL    Platelets 366 372 - 146 Thousands/uL    nRBC 0 /100 WBCs    Neutrophils Relative 81 (H) 43 - 75 %    Immat GRANS % 0 0 - 2 %    Lymphocytes Relative 9 (L) 14 - 44 %    Monocytes Relative 9 4 - 12 %    Eosinophils Relative 1 0 - 6 %    Basophils Relative 0 0 - 1 %    Neutrophils Absolute 9 54 (H) 1 85 - 7 62 Thousands/µL    Immature Grans Absolute 0 05 0 00 - 0 20 Thousand/uL    Lymphocytes Absolute 1 01 0 60 - 4 47 Thousands/µL    Monocytes Absolute 1 08 0 17 - 1 22 Thousand/µL    Eosinophils Absolute 0 11 0 00 - 0 61 Thousand/µL    Basophils Absolute 0 05 0 00 - 0 10 Thousands/µL   Comprehensive metabolic panel   Result Value Ref Range    Sodium 141 135 - 147 mmol/L    Potassium 4 3 3 5 - 5 3 mmol/L    Chloride 109 (H) 96 - 108 mmol/L    CO2 22 21 - 32 mmol/L    ANION GAP 10 4 - 13 mmol/L    BUN 22 5 - 25 mg/dL    Creatinine 1 39 (H) 0 60 - 1 30 mg/dL    Glucose 125 65 - 140 mg/dL    Calcium 8 9 8 3 - 10 1 mg/dL    Corrected Calcium 9 5 8 3 - 10 1 mg/dL    AST 22 5 - 45 U/L    ALT 17 12 - 78 U/L    Alkaline Phosphatase 61 46 - 116 U/L    Total Protein 6 9 6 4 - 8 4 g/dL    Albumin 3 2 (L) 3 5 - 5 0 g/dL    Total Bilirubin 0 34 0 20 - 1 00 mg/dL    eGFR 44 ml/min/1 73sq m   HS Troponin 0hr (reflex protocol)   Result Value Ref Range    hs TnI 0hr 10 "Refer to ACS Flowchart"- see link ng/L   UA w Reflex to Microscopic w Reflex to Culture    Specimen: Urine, Straight Cath   Result Value Ref Range    Color, UA Yellow     Clarity, UA Slightly Cloudy     Specific [place], UA 1 020 1 003 - 1 030    pH, UA 6 5 4 5, 5 0, 5 5, 6 0, 6 5, 7 0, 7 5, 8 0    Leukocytes, UA Negative Negative    Nitrite, UA Negative Negative    Protein, UA 30 (1+) (A) Negative mg/dl    Glucose, UA Negative Negative mg/dl    Ketones, UA Trace (A) Negative mg/dl    Urobilinogen, UA 0 2 0 2, 1 0 E U /dl E U /dl    Bilirubin, UA Negative Negative    Occult Blood, UA Large (A) Negative   HS Troponin I 4hr   Result Value Ref Range    hs TnI 4hr 40 "Refer to ACS Flowchart"- see link ng/L    Delta 4hr hsTnI 30 (H) <20 ng/L   Urine Microscopic   Result Value Ref Range    RBC, UA Innumerable (A) None Seen, 0-1, 1-2, 2-4, 0-5 /hpf    WBC, UA 2-4 None Seen, 0-1, 1-2, 0-5, 2-4 /hpf    Epithelial Cells Occasional None Seen, Occasional /hpf    Bacteria, UA Occasional None Seen, Occasional /hpf   Lactic acid, plasma   Result Value Ref Range    LACTIC ACID 6 1 (HH) 0 5 - 2 0 mmol/L   Lactic acid 2 Hours   Result Value Ref Range    LACTIC ACID 2 0 0 5 - 2 0 mmol/L   Valproic acid level, total   Result Value Ref Range    Valproic Acid, Total 12 (L) 50 - 100 ug/mL   Basic metabolic panel   Result Value Ref Range    Sodium 143 135 - 147 mmol/L    Potassium 3 3 (L) 3 5 - 5 3 mmol/L    Chloride 107 96 - 108 mmol/L    CO2 23 21 - 32 mmol/L    ANION GAP 13 4 - 13 mmol/L    BUN 17 5 - 25 mg/dL    Creatinine 1 06 0 60 - 1 30 mg/dL    Glucose 96 65 - 140 mg/dL    Calcium 8 3 8 3 - 10 1 mg/dL    eGFR 62 ml/min/1 73sq m   CBC and Platelet   Result Value Ref Range    WBC 10 18 (H) 4 31 - 10 16 Thousand/uL    RBC 4 16 3 88 - 5 62 Million/uL    Hemoglobin 12 9 12 0 - 17 0 g/dL    Hematocrit 38 2 36 5 - 49 3 %    MCV 92 82 - 98 fL    MCH 31 0 26 8 - 34 3 pg    MCHC 33 8 31 4 - 37 4 g/dL    RDW 13 2 11 6 - 15 1 %    Platelets 467 699 - 414 Thousands/uL MPV 10 4 8 9 - 12 7 fL   CK (with reflex to MB)   Result Value Ref Range    Total  39 - 308 U/L   Protime-INR   Result Value Ref Range    Protime 14 6 (H) 11 6 - 14 5 seconds    INR 1 11 0 84 - 1 19   CKMB   Result Value Ref Range    CK-MB Index <1 0 0 0 - 2 5 %    CK-MB 2 7 0 0 - 5 0 ng/mL   CBC   Result Value Ref Range    WBC 6 82 4 31 - 10 16 Thousand/uL    RBC 4 35 3 88 - 5 62 Million/uL    Hemoglobin 13 4 12 0 - 17 0 g/dL    Hematocrit 40 1 36 5 - 49 3 %    MCV 92 82 - 98 fL    MCH 30 8 26 8 - 34 3 pg    MCHC 33 4 31 4 - 37 4 g/dL    RDW 13 1 11 6 - 15 1 %    Platelets 716 610 - 855 Thousands/uL    MPV 10 4 8 9 - 12 7 fL   Comprehensive metabolic panel   Result Value Ref Range    Sodium 143 135 - 147 mmol/L    Potassium 3 4 (L) 3 5 - 5 3 mmol/L    Chloride 108 96 - 108 mmol/L    CO2 27 21 - 32 mmol/L    ANION GAP 8 4 - 13 mmol/L    BUN 8 5 - 25 mg/dL    Creatinine 0 98 0 60 - 1 30 mg/dL    Glucose 87 65 - 140 mg/dL    Calcium 8 9 8 3 - 10 1 mg/dL    Corrected Calcium 9 6 8 3 - 10 1 mg/dL    AST 21 5 - 45 U/L    ALT 15 12 - 78 U/L    Alkaline Phosphatase 65 46 - 116 U/L    Total Protein 6 4 6 4 - 8 4 g/dL    Albumin 3 1 (L) 3 5 - 5 0 g/dL    Total Bilirubin 0 60 0 20 - 1 00 mg/dL    eGFR 68 ml/min/1 73sq m   CBC and differential   Result Value Ref Range    WBC 7 82 4 31 - 10 16 Thousand/uL    RBC 4 59 3 88 - 5 62 Million/uL    Hemoglobin 14 0 12 0 - 17 0 g/dL    Hematocrit 42 1 36 5 - 49 3 %    MCV 92 82 - 98 fL    MCH 30 5 26 8 - 34 3 pg    MCHC 33 3 31 4 - 37 4 g/dL    RDW 13 1 11 6 - 15 1 %    MPV 10 4 8 9 - 12 7 fL    Platelets 499 107 - 449 Thousands/uL    nRBC 0 /100 WBCs    Neutrophils Relative 69 43 - 75 %    Immat GRANS % 0 0 - 2 %    Lymphocytes Relative 16 14 - 44 %    Monocytes Relative 12 4 - 12 %    Eosinophils Relative 3 0 - 6 %    Basophils Relative 0 0 - 1 %    Neutrophils Absolute 5 36 1 85 - 7 62 Thousands/µL    Immature Grans Absolute 0 02 0 00 - 0 20 Thousand/uL    Lymphocytes Absolute 1 24 0 60 - 4 47 Thousands/µL    Monocytes Absolute 0 97 0 17 - 1 22 Thousand/µL    Eosinophils Absolute 0 20 0 00 - 0 61 Thousand/µL    Basophils Absolute 0 03 0 00 - 0 10 Thousands/µL   Comprehensive metabolic panel   Result Value Ref Range    Sodium 142 135 - 147 mmol/L    Potassium 3 5 3 5 - 5 3 mmol/L    Chloride 104 96 - 108 mmol/L    CO2 30 21 - 32 mmol/L    ANION GAP 8 4 - 13 mmol/L    BUN 7 5 - 25 mg/dL    Creatinine 1 02 0 60 - 1 30 mg/dL    Glucose 88 65 - 140 mg/dL    Calcium 8 9 8 3 - 10 1 mg/dL    Corrected Calcium 9 6 8 3 - 10 1 mg/dL    AST 25 5 - 45 U/L    ALT 13 12 - 78 U/L    Alkaline Phosphatase 64 46 - 116 U/L    Total Protein 6 4 6 4 - 8 4 g/dL    Albumin 3 1 (L) 3 5 - 5 0 g/dL    Total Bilirubin 0 59 0 20 - 1 00 mg/dL    eGFR 65 ml/min/1 73sq m   Magnesium   Result Value Ref Range    Magnesium 1 8 1 6 - 2 6 mg/dL   Phosphorus   Result Value Ref Range    Phosphorus 3 8 2 3 - 4 1 mg/dL   Procalcitonin   Result Value Ref Range    Procalcitonin <0 05 <=0 25 ng/ml   Protime-INR   Result Value Ref Range    Protime 15 2 (H) 11 6 - 14 5 seconds    INR 1 18 0 84 - 1 19   CBC and differential   Result Value Ref Range    WBC 6 94 4 31 - 10 16 Thousand/uL    RBC 4 53 3 88 - 5 62 Million/uL    Hemoglobin 13 7 12 0 - 17 0 g/dL    Hematocrit 40 7 36 5 - 49 3 %    MCV 90 82 - 98 fL    MCH 30 2 26 8 - 34 3 pg    MCHC 33 7 31 4 - 37 4 g/dL    RDW 13 1 11 6 - 15 1 %    MPV 10 5 8 9 - 12 7 fL    Platelets 129 582 - 268 Thousands/uL    nRBC 0 /100 WBCs    Neutrophils Relative 63 43 - 75 %    Immat GRANS % 0 0 - 2 %    Lymphocytes Relative 20 14 - 44 %    Monocytes Relative 13 (H) 4 - 12 %    Eosinophils Relative 3 0 - 6 %    Basophils Relative 1 0 - 1 %    Neutrophils Absolute 4 40 1 85 - 7 62 Thousands/µL    Immature Grans Absolute 0 02 0 00 - 0 20 Thousand/uL    Lymphocytes Absolute 1 38 0 60 - 4 47 Thousands/µL    Monocytes Absolute 0 92 0 17 - 1 22 Thousand/µL    Eosinophils Absolute 0 18 0 00 - 0 61 Thousand/µL    Basophils Absolute 0 04 0 00 - 0 10 Thousands/µL   Basic metabolic panel   Result Value Ref Range    Sodium 142 135 - 147 mmol/L    Potassium 3 3 (L) 3 5 - 5 3 mmol/L    Chloride 108 96 - 108 mmol/L    CO2 26 21 - 32 mmol/L    ANION GAP 8 4 - 13 mmol/L    BUN 10 5 - 25 mg/dL    Creatinine 0 97 0 60 - 1 30 mg/dL    Glucose 94 65 - 140 mg/dL    Calcium 8 5 8 3 - 10 1 mg/dL    eGFR 69 ml/min/1 73sq m   ECG 12 lead   Result Value Ref Range    Ventricular Rate 79 BPM    Atrial Rate 79 BPM    HI Interval 150 ms    QRSD Interval 82 ms    QT Interval 364 ms    QTC Interval 417 ms    P [place] 54 degrees    QRS Axis 76 degrees    T Wave Axis 44 degrees           * I Have Reviewed All Lab Data Listed Above  * Additional Pertinent Lab Tests Reviewed: All Labs Within Last 24 Hours Reviewed    Imaging:    Imaging Reports Reviewed Today Include: no new  Imaging Personally Reviewed by Myself Includes:  N/A    Recent Cultures (last 7 days):     Results from last 7 days   Lab Units 10/30/22  1022 10/28/22  0138   BLOOD CULTURE  No Growth at 24 hrs  No Growth at 24 hrs   Staphylococcus coagulase negative*   GRAM STAIN RESULT   --  Gram positive rods*  Gram positive cocci in pairs and chains*  Gram positive cocci in pairs*       Last 24 Hours Medication List:   Current Facility-Administered Medications   Medication Dose Route Frequency Provider Last Rate   • acetaminophen  650 mg Oral Q6H PRN Ruthy Harrell MD     • amLODIPine  2 5 mg Oral Daily Franky Joseph MD     • divalproex sodium  125 mg Oral Q12H [address] Franky Joseph MD     • donepezil  10 mg Oral HS Ruthy Harrell MD     • haloperidol lactate  2 mg Intramuscular Q6H PRN David Fleischer Sherlyn, DO     • heparin (porcine)  5,000 Units Subcutaneous Q12H [address] Ruthy Harrell MD     • iohexol  100 mL Intravenous Once in imaging Ruthy Harrell MD     • labetalol  10 mg Intravenous Q6H PRN David Fleischer Sherlyn, DO     • [START ON 11/1/2022] losartan  100 mg Oral Daily Kimberly Toney MD     • memantine  5 mg Oral Daily Mana Pete MD     • metroNIDAZOLE  500 mg Intravenous Q8H Mana Pete  mg (10/31/22 1237)   • ondansetron  4 mg Intravenous Q6H PRN Mana Pete MD     • pantoprazole  40 mg Oral Daily Kimberly Toney MD     • potassium chloride  40 mEq Oral Once Kimberly Toney MD     • pravastatin  40 mg Oral HS Mana Pete MD     • QUEtiapine  25 mg Oral Early Morning Mana Pete MD     • QUEtiapine  50 mg Oral HS Mana Pete MD     • senna-docusate sodium  1 tablet Oral HS Fabio Solomon MD     • traZODone  50 mg Oral HS Mana Pete MD     • vancomycin  10 mg/kg Intravenous Q12H Vicki boss  mg (10/31/22 0902)        Today, Patient Was Seen By: Kimberly Toney    ** Please Note: Dictation voice to text software may have been used in the creation of this document   **

## 2022-10-31 NOTE — PROGRESS NOTES
Progress Note - Dallas Lujan 80 y o  male MRN: [id number]    Unit/Bed#: 626-20 Encounter: [id number]      Assessment:  79 y/o M w h/o dementia presenting w worsening encephalopathy and acute colitis, likely ischemic  Vss  Afebrile  abd soft, nontnder, non distended  Lab: wbc: 7->6    Plan:  Diet as tolerated  Continue antibiotics  Avoid hypotension  Delirium precautions  Ambulate  Rest of care per  Primary service  dvt ppx    Subjective:   Confused  Oriented only to self  Objective:     Vitals: Blood pressure 149/90, pulse 65, temperature 98 2 °F (36 8 °C), temperature source Oral, resp  rate 20, height 5' 10" (1 778 m), weight 74 1 kg (163 lb 5 8 oz), SpO2 94 %  ,Body mass index is 23 44 kg/m²  Intake/Output Summary (Last 24 hours) at 10/31/2022 0856  Last data filed at 10/31/2022 0837  Gross per 24 hour   Intake 400 ml   Output 475 ml   Net -75 ml       Physical Exam  General: NAD  HEENT: NC/AT  MMM  Cv: RRR  Lungs: normal effort  Ab: Soft, NT/ND  Ex: no CCE  Neuro: gcs 13  Confused  Invasive Devices  Report    Peripheral Intravenous Line  Duration           Peripheral IV 10/29/22 Left;Proximal;Ventral (anterior) Forearm 1 day                Lab, Imaging and other studies: I have personally reviewed pertinent reports      VTE Pharmacologic Prophylaxis: Sequential compression device (Venodyne)   VTE Mechanical Prophylaxis: sequential compression device

## 2022-10-31 NOTE — PLAN OF CARE
Problem: PAIN - ADULT  Goal: Verbalizes/displays adequate comfort level or baseline comfort level  Description: Interventions:  - Encourage patient to monitor pain and request assistance  - Assess pain using appropriate pain scale (0-10 pain scale)  - Administer analgesics based on type and severity of pain and evaluate response  - Implement non-pharmacological measures as appropriate and evaluate response  - Consider cultural and social influences on pain and pain management  - Notify physician/advanced practitioner if interventions unsuccessful or patient reports new pain  10/31/2022 1116 by Barbi Davenport RN  Outcome: Progressing  10/31/2022 1116 by Barbi Davenport RN  Outcome: Progressing     Problem: INFECTION - ADULT  Goal: Absence or prevention of progression during hospitalization  Description: INTERVENTIONS:  - Assess and monitor for signs and symptoms of infection  - Monitor lab/diagnostic results  - Monitor all insertion sites, i e  indwelling lines  - Administer medications as ordered  - Instruct and encourage patient and family to use good hand hygiene technique  10/31/2022 1116 by Barbi Davenport RN  Outcome: Progressing  10/31/2022 1116 by Barbi Davenport RN  Outcome: Progressing     Problem: SAFETY ADULT  Goal: Patient will remain free of falls  Description: INTERVENTIONS:  - Educate patient/family on patient safety including physical limitations  - Instruct patient to call for assistance with activity (min assist)  - Consult OT/PT to assist with strengthening/mobility   - Keep Call bell within reach  - Keep bed low and locked with side rails adjusted as appropriate  - Keep care items and personal belongings within reach  - Initiate and maintain comfort rounds  - Make Fall Risk Sign visible to staff (high fall risk)  - Offer Toileting every 1-2 Hours, in advance of need  - Initiate/Maintain bed/chair alarm  - Obtain necessary fall risk management equipment: nonskid footwear, call bell within reach  - Apply yellow socks and bracelet for high fall risk patients  - Consider moving patient to room near nurses station  10/31/2022 1116 by Corinne Puentes RN  Outcome: Progressing  10/31/2022 1116 by Corinne Puentes RN  Outcome: Progressing  Goal: Maintain or return to baseline ADL function  Description: INTERVENTIONS:  -  Assess patient's ability to carry out ADLs; (mod to max assist)  - Assess/evaluate cause of self-care deficits (confusion, fatigue, limited movement)  - Assess range of motion  - Assess patient's mobility; (min assist)  - Assess patient's need for assistive devices and provide as appropriate  - Encourage maximum independence but intervene and supervise when necessary  - Involve family in performance of ADLs  - Assess for home care needs following discharge   - Consider OT consult to assist with ADL evaluation and planning for discharge  - Provide patient education as appropriate  10/31/2022 1116 by Corinne Puentes RN  Outcome: Progressing  10/31/2022 1116 by Corinne Puentes RN  Outcome: Progressing  Goal: Maintains/Returns to pre admission functional level  Description: INTERVENTIONS:  - Perform BMAT or MOVE assessment daily    - Set and communicate daily mobility goal to care team and patient/family/caregiver  - Collaborate with rehabilitation services on mobility goals if consulted  - Perform Range of Motion 3-4 times a day  - Reposition patient every 1-2 hours    - Dangle patient 3-4 times a day  - Stand patient 3-4 times a day  - Ambulate patient 3-4 times a day  - Out of bed to chair 3-4 times a day   - Out of bed for meals 3-4 times a day  - Out of bed for toileting  - Record patient progress and toleration of activity level   10/31/2022 1116 by Corinne Puentes RN  Outcome: Progressing  10/31/2022 1116 by Corinne Puentes RN  Outcome: Progressing     Problem: DISCHARGE PLANNING  Goal: Discharge to home or other facility with appropriate resources  Description: INTERVENTIONS:  - Identify barriers to discharge w/patient and caregiver  - Arrange for needed discharge resources and transportation as appropriate  - Identify discharge learning needs (meds, wound care, etc )  - Refer to Case Management Department for coordinating discharge planning if the patient needs post-hospital services based on physician/advanced practitioner order or complex needs related to functional status, cognitive ability, or social support system  10/31/2022 1116 by Jose Barcenas RN  Outcome: Progressing  10/31/2022 1116 by Jose Barcenas RN  Outcome: Progressing     Problem: Knowledge Deficit  Goal: Patient/family/caregiver demonstrates understanding of disease process, treatment plan, medications, and discharge instructions  Description: Complete learning assessment and assess knowledge base    Interventions:  - Provide teaching at level of understanding  - Provide teaching via preferred learning methods  10/31/2022 1116 by Jose Barcenas RN  Outcome: Progressing  10/31/2022 1116 by Jose Barcenas RN  Outcome: Progressing     Problem: Potential for Falls  Goal: Patient will remain free of falls  Description: INTERVENTIONS:  - Educate patient/family on patient safety including physical limitations  - Instruct patient to call for assistance with activity (min assist)  - Consult OT/PT to assist with strengthening/mobility   - Keep Call bell within reach  - Keep bed low and locked with side rails adjusted as appropriate  - Keep care items and personal belongings within reach  - Initiate and maintain comfort rounds  - Make Fall Risk Sign visible to staff (high fall risk)  - Offer Toileting every 1-2 Hours, in advance of need  - Initiate/Maintain bed/chair alarm  - Obtain necessary fall risk management equipment: nonskid footwear, call bell within reach  - Apply yellow socks and bracelet for high fall risk patients  - Consider moving patient to room near nurses station  10/31/2022 1116 by Jose Barcenas RN  Outcome: Progressing  10/31/2022 1116 by Johann Mendieta RN  Outcome: Progressing     Problem: Prexisting or High Potential for Compromised Skin Integrity  Goal: Skin integrity is maintained or improved  Description: INTERVENTIONS:  - Identify patients at risk for skin breakdown  - Assess and monitor skin integrity  - Assess and monitor nutrition and hydration status  - Monitor labs   - Assess for incontinence (every 1-2 hours and prn)  - Turn and reposition patient (every 2 hours and prn)  - Assist with mobility/ambulation (min assist)  - Relieve pressure over bony prominences  - Avoid friction and shearing  - Provide appropriate hygiene as needed including keeping skin clean and dry  - Evaluate need for skin moisturizer/barrier cream  - Collaborate with interdisciplinary team   - Patient/family teaching  10/31/2022 1116 by Johann Mendieta RN  Outcome: Progressing  10/31/2022 1116 by Johann Mendieta RN  Outcome: Progressing     Problem: MOBILITY - ADULT  Goal: Maintain or return to baseline ADL function  Description: INTERVENTIONS:  -  Assess patient's ability to carry out ADLs; (mod to max assist)  - Assess/evaluate cause of self-care deficits (confusion, fatigue, limited movement)  - Assess range of motion  - Assess patient's mobility; (min assist)  - Assess patient's need for assistive devices and provide as appropriate  - Encourage maximum independence but intervene and supervise when necessary  - Involve family in performance of ADLs  - Assess for home care needs following discharge   - Consider OT consult to assist with ADL evaluation and planning for discharge  - Provide patient education as appropriate  10/31/2022 1116 by Johann Mendieta RN  Outcome: Progressing  10/31/2022 1116 by Johann Mendieta RN  Outcome: Progressing  Goal: Maintains/Returns to pre admission functional level  Description: INTERVENTIONS:  - Perform BMAT or MOVE assessment daily    - Set and communicate daily mobility goal to care team and patient/family/caregiver  - Collaborate with rehabilitation services on mobility goals if consulted  - Perform Range of Motion 3-4 times a day  - Reposition patient every 1-2 hours    - Dangle patient 3-4 times a day  - Stand patient 3-4 times a day  - Ambulate patient 3-4 times a day  - Out of bed to chair 3-4 times a day   - Out of bed for meals 3-4 times a day  - Out of bed for toileting  - Record patient progress and toleration of activity level   10/31/2022 1116 by Johann Mendieta RN  Outcome: Progressing  10/31/2022 1116 by Johann Mendieta RN  Outcome: Progressing     Problem: GASTROINTESTINAL - ADULT  Goal: Maintains or returns to baseline bowel function  Description: INTERVENTIONS:  - Assess bowel function  - Encourage oral fluids to ensure adequate hydration  - Administer IV fluids if ordered to ensure adequate hydration  - Administer ordered medications as needed  - Encourage mobilization and activity  - Consider nutritional services referral to assist patient with adequate nutrition and appropriate food choices  10/31/2022 1116 by Johann Mendieta RN  Outcome: Progressing  10/31/2022 1116 by Johann Mendieta RN  Outcome: Progressing  Goal: Maintains adequate nutritional intake  Description: INTERVENTIONS:  - Monitor percentage of each meal consumed  - Identify factors contributing to decreased intake, treat as appropriate  - Assist with meals as needed  - Monitor I&O, weight, and lab values if indicated  - Obtain nutrition services referral as needed  10/31/2022 1116 by Johann Mendieta RN  Outcome: Progressing  10/31/2022 1116 by Johann Mendieta RN  Outcome: Progressing     Problem: SAFETY,RESTRAINT: NV/NON-SELF DESTRUCTIVE BEHAVIOR  Goal: Remains free of harm/injury (restraint for non violent/non self-detsructive behavior)  Description: INTERVENTIONS:  - Instruct patient/family regarding restraint use   - Assess and monitor physiologic and psychological status   - Provide interventions and comfort measures to meet assessed patient needs   - Identify and implement measures to help patient regain control  - Assess readiness for release of restraint   10/31/2022 1116 by Bella Mena RN  Outcome: Progressing  10/31/2022 1116 by Bella Mena RN  Outcome: Progressing  Goal: Returns to optimal restraint-free functioning  Description: INTERVENTIONS:  - Assess the patient's behavior and symptoms that indicate continued need for restraint  - Identify and implement measures to help patient regain control  - Assess readiness for release of restraint   10/31/2022 1116 by Bella Mena RN  Outcome: Progressing  10/31/2022 1116 by Bella Mena RN  Outcome: Progressing

## 2022-10-31 NOTE — QUICK NOTE
Encountered the patient's ex-wife as she was walking through the hallway towards his room  We walked to his room for privacy and discussed the patient's condition    I explained the need for continued antibiotics and the possibility that many of his delirium symptoms may also been caused by dehydration to which she noted he has been particularly forgetful when it comes to drinking water as his dementia has progressed   -Cleopatra Wilks

## 2022-10-31 NOTE — ASSESSMENT & PLAN NOTE
· H&P listed sepsis as one of the primary diagnoses, SIRS criteria has not presented itself so Acute Colitis is the working primary diagnosis at this time    · CT abd/pelvis showed acute colitis as likely infectious source  · Continue ceftriaxone and Flagyl due to positive blood culture results  · ID consulted and following  · Possible ischemic colitis versus infectious colitis  · Bcxs growing G+ rods, G+ cocci in pairs & chains, likely contaminants given the multitudinous types of bacteria resulting, but cannot rule out bacteremia at this time  · Obtained 2nd set of blood cultures 10/30/22  · If negative patient likely will be ready for discharge to rehab versus [facility]  · Acute care surgery and GI consulted, will appreciate recs  · Diet: regular

## 2022-10-31 NOTE — ASSESSMENT & PLAN NOTE
Acute metabolic encephalopathy, present on admission, as evidenced by increased confusion per the nursing facility, transient hypotension, patient does have underlying dementia    -Haloperidol prn for aggression  -Virtual observer in place  · PT/OT consulted

## 2022-10-31 NOTE — ASSESSMENT & PLAN NOTE
· Norvasc 2 5 mg daily  · Patient required a prn labetalol for severe hypertension  · Therefore, will restart angiotensin receptor blocker    · Irbesartan not on formulary, equivalent losartan daily dose started

## 2022-10-31 NOTE — PROGRESS NOTES
Vancomycin IV Pharmacy-to-Dose Consultation    Parveen Alfaro is a 80 y o  male who is currently receiving Vancomycin IV with management by the Pharmacy Consult service  Assessment/Plan:  The patient was reviewed  Renal function is stable and no signs or symptoms of nephrotoxicity and/or infusion reactions were documented in the chart  Based on today’s assessment, continue current vancomycin (day # 2) dosing of 750mg q12h, with a plan for trough to be drawn at 0830 on 11/1/22  We will continue to follow the patient’s culture results and clinical progress daily      Alysha Maldonado

## 2022-11-01 LAB
ANION GAP SERPL CALCULATED.3IONS-SCNC: 9 MMOL/L (ref 4–13)
BACTERIA BLD CULT: ABNORMAL
BUN SERPL-MCNC: 12 MG/DL (ref 5–25)
CALCIUM SERPL-MCNC: 8.5 MG/DL (ref 8.3–10.1)
CHLORIDE SERPL-SCNC: 107 MMOL/L (ref 96–108)
CO2 SERPL-SCNC: 26 MMOL/L (ref 21–32)
CREAT SERPL-MCNC: 0.94 MG/DL (ref 0.6–1.3)
GFR SERPL CREATININE-BSD FRML MDRD: 72 ML/MIN/1.73SQ M
GLUCOSE SERPL-MCNC: 101 MG/DL (ref 65–140)
GRAM STN SPEC: ABNORMAL
POTASSIUM SERPL-SCNC: 3.3 MMOL/L (ref 3.5–5.3)
S AUREUS+CONS DNA BLD POS NAA+NON-PROBE: DETECTED
SODIUM SERPL-SCNC: 142 MMOL/L (ref 135–147)
VANCOMYCIN TROUGH SERPL-MCNC: 9.1 UG/ML (ref 10–20)

## 2022-11-01 RX ORDER — OLANZAPINE 10 MG/1
5 INJECTION, POWDER, LYOPHILIZED, FOR SOLUTION INTRAMUSCULAR ONCE
Status: COMPLETED | OUTPATIENT
Start: 2022-11-01 | End: 2022-11-01

## 2022-11-01 RX ORDER — WATER 1000 ML/1000ML
INJECTION, SOLUTION INTRAVENOUS
Status: COMPLETED
Start: 2022-11-01 | End: 2022-11-01

## 2022-11-01 RX ORDER — POTASSIUM CHLORIDE 20 MEQ/1
40 TABLET, EXTENDED RELEASE ORAL ONCE
Status: COMPLETED | OUTPATIENT
Start: 2022-11-01 | End: 2022-11-01

## 2022-11-01 RX ADMIN — METRONIDAZOLE 500 MG: 5 INJECTION, SOLUTION INTRAVENOUS at 02:00

## 2022-11-01 RX ADMIN — VANCOMYCIN HYDROCHLORIDE 750 MG: 750 INJECTION, POWDER, LYOPHILIZED, FOR SOLUTION INTRAVENOUS at 08:56

## 2022-11-01 RX ADMIN — DIVALPROEX SODIUM 125 MG: 125 CAPSULE, COATED PELLETS ORAL at 08:56

## 2022-11-01 RX ADMIN — HEPARIN SODIUM 5000 UNITS: 5000 INJECTION INTRAVENOUS; SUBCUTANEOUS at 08:56

## 2022-11-01 RX ADMIN — METRONIDAZOLE 500 MG: 5 INJECTION, SOLUTION INTRAVENOUS at 19:26

## 2022-11-01 RX ADMIN — TRAZODONE HYDROCHLORIDE 50 MG: 50 TABLET ORAL at 21:41

## 2022-11-01 RX ADMIN — SENNOSIDES AND DOCUSATE SODIUM 1 TABLET: 50; 8.6 TABLET ORAL at 21:41

## 2022-11-01 RX ADMIN — QUETIAPINE FUMARATE 25 MG: 25 TABLET ORAL at 05:42

## 2022-11-01 RX ADMIN — AMLODIPINE BESYLATE 2.5 MG: 2.5 TABLET ORAL at 08:56

## 2022-11-01 RX ADMIN — POTASSIUM CHLORIDE 40 MEQ: 1500 TABLET, EXTENDED RELEASE ORAL at 08:56

## 2022-11-01 RX ADMIN — OLANZAPINE 5 MG: 10 INJECTION, POWDER, FOR SOLUTION INTRAMUSCULAR at 01:41

## 2022-11-01 RX ADMIN — MEMANTINE 5 MG: 5 TABLET ORAL at 08:56

## 2022-11-01 RX ADMIN — DONEPEZIL HYDROCHLORIDE 10 MG: 5 TABLET ORAL at 21:41

## 2022-11-01 RX ADMIN — LOSARTAN POTASSIUM 100 MG: 50 TABLET, FILM COATED ORAL at 21:41

## 2022-11-01 RX ADMIN — PANTOPRAZOLE SODIUM 40 MG: 40 TABLET, DELAYED RELEASE ORAL at 08:56

## 2022-11-01 RX ADMIN — METRONIDAZOLE 500 MG: 5 INJECTION, SOLUTION INTRAVENOUS at 10:53

## 2022-11-01 RX ADMIN — WATER 2.1 ML: 1 INJECTION INTRAMUSCULAR; INTRAVENOUS; SUBCUTANEOUS at 01:44

## 2022-11-01 RX ADMIN — VANCOMYCIN HYDROCHLORIDE 1250 MG: 5 INJECTION, POWDER, LYOPHILIZED, FOR SOLUTION INTRAVENOUS at 17:18

## 2022-11-01 RX ADMIN — PRAVASTATIN SODIUM 40 MG: 40 TABLET ORAL at 21:41

## 2022-11-01 RX ADMIN — DIVALPROEX SODIUM 125 MG: 125 CAPSULE, COATED PELLETS ORAL at 21:41

## 2022-11-01 RX ADMIN — QUETIAPINE FUMARATE 50 MG: 25 TABLET ORAL at 21:41

## 2022-11-01 NOTE — ASSESSMENT & PLAN NOTE
Acute metabolic encephalopathy, present on admission, as evidenced by increased confusion per the nursing facility, transient hypotension, patient does have underlying dementia   Confusion has been minimal for the past few days  -Haloperidol prn for aggression  -Virtual observer in place  · PT/OT consulted

## 2022-11-01 NOTE — PROGRESS NOTES
[facility] [address]  Progress Note - Pedro Pizano 11/7/1933, 80 y o  male MRN: [id number]  Unit/Bed#: 829-13 Encounter: [id number]  Primary Care Provider: Kate Sandoval DO   Date and time admitted to hospital: 10/27/2022 10:38 PM    * Acute colitis  Assessment & Plan  · H&P listed sepsis as one of the primary diagnoses, SIRS criteria has not presented itself so Acute Colitis is the working primary diagnosis at this time  · CT abd/pelvis showed acute colitis as likely infectious source  · Continue ceftriaxone and Flagyl due to positive blood culture results  · ID consulted and following  · Possible ischemic colitis versus infectious colitis  · Bcxs growing G+ rods, G+ cocci in pairs & chains, likely contaminants given the multitudinous types of bacteria resulting, ID considers them most likely contaminants at this time  · Obtained 2nd set of blood cultures 10/30/22  · Negative @ 24h, tomorrow will be discharged to rehab outpatient program at [facility], case management making arrangements today  · Upon discharge, change antibiotics to PO augmentin  · Acute care surgery and GI consulted, will appreciate recs  · Diet: regular    Acute delirium  Assessment & Plan  Acute metabolic encephalopathy, present on admission, as evidenced by increased confusion per the nursing facility, transient hypotension, patient does have underlying dementia   Confusion has been minimal for the past few days  -Haloperidol prn for aggression  -Virtual observer in place  · PT/OT consulted    Severe dementia with agitation  Assessment & Plan  See acute delirium A&P    Acute kidney injury [facility])  Assessment & Plan  Acute kidney injury was present on admission, resolved  -Creatinine 0 94    Primary hypertension  Assessment & Plan  · Norvasc & losartan (replacement for off-formulary irbesartan during hospital stay)  · Restart irbesartan on discharge    Hypokalemia  Assessment & Plan  · K+ today 3 3, will replete and obtain morning BMP      Lactic acidosis  Assessment & Plan  · Lactate 6 1 on admit, most recent lactate 2 0 s/p 2L fluid bolus resuscitation  · Likely 2/2 hypovolemia, resolved        VTE Pharmacologic Prophylaxis:   Pharmacologic: Heparin  Mechanical VTE Prophylaxis in Place: Yes    Patient Centered Rounds: I have evaluated the patient at bedside today    Discussions with Specialists or Other Care Team Provider: case management    Education and Discussions with Family / Patient: Will reach out today and detail in Quick Note    Time Spent for Care: 30 minutes  More than 50% of total time spent on counseling and coordination of care as described above  Current Length of Stay: 4 day(s)    Current Patient Status: Inpatient   Certification Statement: The patient will continue to require additional inpatient hospital stay due to rehab arrangements at [facility]    Discharge Plan: Tomorrow    Code Status: Level 3 - DNAR and DNI      Subjective:   Metronidazole day 5 vancomycin day 3, PT/OT/ID following  Patient had no hypertension overnight and is doing the same as before, without any new or worsening symptoms  Objective:     Vitals:   Temp (24hrs), Av 6 °F (36 4 °C), Min:97 5 °F (36 4 °C), Max:97 7 °F (36 5 °C)    Temp:  [97 5 °F (36 4 °C)-97 7 °F (36 5 °C)] 97 5 °F (36 4 °C)  HR:  [69-78] 78  Resp:  [15-20] 20  BP: (139-162)/(90-99) 139/99  SpO2:  [94 %-97 %] 94 %  Body mass index is 23 79 kg/m²  Input and Output Summary (last 24 hours): Intake/Output Summary (Last 24 hours) at 2022 1215  Last data filed at 2022 1209  Gross per 24 hour   Intake 500 ml   Output 800 ml   Net -300 ml       Physical Exam:     Physical Exam  Vitals and nursing note reviewed  Constitutional:       Appearance: He is well-developed  HENT:      Head: Normocephalic and atraumatic  Eyes:      General: No scleral icterus  Right eye: No discharge  Left eye: No discharge        Conjunctiva/sclera: Conjunctivae normal    Cardiovascular:      Rate and Rhythm: Normal rate and regular rhythm  Heart sounds: No murmur heard  Pulmonary:      Effort: Pulmonary effort is normal  No respiratory distress  Breath sounds: Normal breath sounds  No wheezing, rhonchi or rales  Abdominal:      General: Abdomen is flat  Bowel sounds are normal  There is no distension  Palpations: Abdomen is soft  There is no mass  Tenderness: There is no abdominal tenderness  There is no guarding or rebound  Musculoskeletal:         General: No swelling  Cervical back: Neck supple  Skin:     General: Skin is warm and dry  Neurological:      Mental Status: He is alert  Psychiatric:         Behavior: Behavior normal       Comments: Mood dysphoric, reminiscing about regrets related to mishandling of his marriage and drinking in the past   Baseline dementia otherwise, no evidence of delirium at this time  Additional Data:     Labs:    Results from last 7 days   Lab Units 10/31/22  0627   WBC Thousand/uL 6 94   HEMOGLOBIN g/dL 13 7   HEMATOCRIT % 40 7   PLATELETS Thousands/uL 163   NEUTROS PCT % 63   LYMPHS PCT % 20   MONOS PCT % 13*   EOS PCT % 3     Results from last 7 days   Lab Units 11/01/22  0542 10/31/22  0627 10/30/22  0457   SODIUM mmol/L 142   < > 142   POTASSIUM mmol/L 3 3*   < > 3 5   CHLORIDE mmol/L 107   < > 104   CO2 mmol/L 26   < > 30   BUN mg/dL 12   < > 7   CREATININE mg/dL 0 94   < > 1 02   ANION GAP mmol/L 9   < > 8   CALCIUM mg/dL 8 5   < > 8 9   ALBUMIN g/dL  --   --  3 1*   TOTAL BILIRUBIN mg/dL  --   --  0 59   ALK PHOS U/L  --   --  64   ALT U/L  --   --  13   AST U/L  --   --  25   GLUCOSE RANDOM mg/dL 101   < > 88    < > = values in this interval not displayed       Results from last 7 days   Lab Units 10/30/22  0457   INR  1 18             Results from last 7 days   Lab Units 10/30/22  0457 10/28/22  0524 10/28/22  0142   LACTIC ACID mmol/L  --  2 0 6 1*   PROCALCITONIN ng/ml <0 05  --   --            * I Have Reviewed All Lab Data Listed Above  * Additional Pertinent Lab Tests Reviewed: All Labs Within Last 24 Hours Reviewed    Imaging:    Imaging Reports Reviewed Today Include: no new  Imaging Personally Reviewed by Myself Includes:  N/A    Recent Cultures (last 7 days):     Results from last 7 days   Lab Units 10/30/22  1022 10/28/22  0138   BLOOD CULTURE  No Growth at 24 hrs  No Growth at 24 hrs   Staphylococcus coagulase negative*   GRAM STAIN RESULT   --  Gram positive rods*  Gram positive cocci in pairs and chains*  Gram positive cocci in pairs*       Last 24 Hours Medication List:   Current Facility-Administered Medications   Medication Dose Route Frequency Provider Last Rate   • acetaminophen  650 mg Oral Q6H PRN Cindi Guzman MD     • amLODIPine  2 5 mg Oral Daily Zelalem Vitale MD     • divalproex sodium  125 mg Oral Q12H [address] Zelalem Vitale MD     • donepezil  10 mg Oral HS Cindi Guzman MD     • haloperidol lactate  2 mg Intramuscular Q6H PRN Theke Petersonol, DO     • heparin (porcine)  5,000 Units Subcutaneous Q12H [address] Cindi Guzman MD     • iohexol  100 mL Intravenous Once in imaging Cindi Guzman MD     • labetalol  10 mg Intravenous Q6H PRN Theke Petersonol, DO     • losartan  100 mg Oral Daily Zelalem Vitale MD     • memantine  5 mg Oral Daily Cindi Guzman MD     • metroNIDAZOLE  500 mg Intravenous Q8H Cindi Guzman  mg (11/01/22 1053)   • ondansetron  4 mg Intravenous Q6H PRN Cindi Guzman MD     • pantoprazole  40 mg Oral Daily Zelalem Vitale MD     • pravastatin  40 mg Oral HS Cindi Guzman MD     • QUEtiapine  25 mg Oral Early Morning Cindi Guzman MD     • QUEtiapine  50 mg Oral HS Cindi Guzman MD     • senna-docusate sodium  1 tablet Oral HS Fabio Gregory MD     • traZODone  50 mg Oral HS Cindi Guzman MD     • vancomycin  1,250 mg Intravenous Q12H Tana Julian MD          Today, Patient Was Seen By: Lola Jewell    ** Please Note: Dictation voice to text software may have been used in the creation of this document   **

## 2022-11-01 NOTE — PROGRESS NOTES
Vancomycin Assessment    Akila Steven is a 80 y o  male who is currently receiving vancomycin 750mg q12h for bacteremia     Relevant clinical data and objective history reviewed:  Creatinine   Date Value Ref Range Status   11/01/2022 0 94 0 60 - 1 30 mg/dL Final     Comment:     Standardized to IDMS reference method   10/31/2022 0 97 0 60 - 1 30 mg/dL Final     Comment:     Standardized to IDMS reference method   10/30/2022 1 02 0 60 - 1 30 mg/dL Final     Comment:     Standardized to IDMS reference method     /99   Pulse 78   Temp 97 5 °F (36 4 °C)   Resp 20   Ht 5' 10" (1 778 m)   Wt 75 2 kg (165 lb 12 6 oz)   SpO2 94%   BMI 23 79 kg/m²   I/O last 3 completed shifts: In: 620 [P O :620]  Out: 1275 [Urine:1275]  Lab Results   Component Value Date/Time    BUN 12 11/01/2022 05:42 AM    WBC 6 94 10/31/2022 06:27 AM    HGB 13 7 10/31/2022 06:27 AM    HCT 40 7 10/31/2022 06:27 AM    MCV 90 10/31/2022 06:27 AM     10/31/2022 06:27 AM     Temp Readings from Last 3 Encounters:   11/01/22 97 5 °F (36 4 °C)   10/25/22 98 8 °F (37 1 °C) (Temporal)   11/23/21 98 6 °F (37 °C) (Tympanic)     Vancomycin Days of Therapy: 3    Assessment/Plan  The patient is currently on vancomycin utilizing scheduled dosing  Baseline risks associated with therapy include: advanced age  The patient is receiving 750mg q12h with the most recent vancomycin level being9 1 at steady-state and sub-therapeutic based on a goal of 15-20 (appropriate for most indications) ; therefore, after clinical evaluation will be changed to 1250mg q12h   Pharmacy will continue to follow closely for s/sx of nephrotoxicity, infusion reactions, and appropriateness of therapy  BMP and CBC will be ordered per protocol  Plan for trough at approximately 1630 on 11/02/22  Pharmacy will continue to follow the patient’s culture results and clinical progress daily      Bob Gardner, Pharmacist

## 2022-11-01 NOTE — INCIDENTAL FINDINGS
The following findings require follow up:  Radiographic finding   Finding: Several scattered hepatic hypodensities likely representing cysts   Follow up required: MRI abdomen   Follow up should be done within 4 week(s)    Please notify the following clinician to assist with the follow up:  [facility]   Primary care physician

## 2022-11-01 NOTE — PHYSICAL THERAPY NOTE
Physical Therapy Evaluation    Patient Name: Ashish RENEEZDEV Date: 11/1/2022     Problem List  Principal Problem:    Acute colitis  Active Problems:    Lactic acidosis    Acute delirium    Severe dementia with agitation    Acute kidney injury ([facility] [address] )    Hypokalemia    Primary hypertension       Past Medical History  Past Medical History:   Diagnosis Date    Hypertension         Past Surgical History  History reviewed  No pertinent surgical history  11/01/22 1432   PT Last Visit   PT Visit Date 11/01/22   Note Type   Note type Evaluation   Pain Assessment   Pain Assessment Tool 0-10   Pain Score No Pain   Restrictions/Precautions   Weight Bearing Precautions Per Order No   Other Precautions Fall Risk;Multiple lines;1:1;Cognitive; Chair Alarm   Home Living   Type of [address] One level;Ramped entrance   Additional Comments pt is a resident of [facility]   Prior Function   Level of [place] Needs assistance with ADLs; Needs assistance with functional mobility; Needs assistance with [address] staff   Receives Help From Home health   IADLs Family/Friend/Other provides transportation   Vocational Retired   General   Family/Caregiver Present Yes  (S/O)   Cognition   Overall Cognitive Status Impaired   Arousal/Participation Alert   Orientation Level Oriented to person;Disoriented to place; Disoriented to time;Disoriented to situation   Following Commands Follows one step commands with increased time or repetition   Comments pt engaging in mostly non-sensical conversation   RLE Assessment   RLE Assessment X  (4-/5 grossly)   LLE Assessment   LLE Assessment X  (4-/5 grossly)   Bed Mobility   Additional Comments pt OOB at start/end of session   Transfers   Sit to Stand 3  Moderate assistance   Additional items Increased time required;Verbal cues; Assist x 2   Stand to Sit 3  Moderate assistance   Additional items Increased time required;Verbal cues; Assist x 2   Additional Comments RW used   Ambulation/Elevation   Gait pattern Not appropriate; Not tested   Balance   Static Sitting Fair +   Dynamic Sitting Fair   Static Standing Poor +   Dynamic Standing Poor   Endurance Deficit   Endurance Deficit Yes   Activity Tolerance   Activity Tolerance Patient limited by fatigue   Assessment   Prognosis Guarded   Problem List Decreased strength;Decreased endurance; Impaired balance;Decreased mobility; Decreased cognition;Decreased safety awareness; Impaired judgement   Assessment Patient is a 80 y o  male evaluated by Physical Therapy s/p admit to [facility] on 10/27/2022 with admitting diagnosis of: Colitis, Unresponsive, and principal problem of: Acute colitis  PT was consulted to assess patient's functional mobility and discharge needs  Ordered are PT Evaluation and treatment  Comorbidities affecting patient's physical performance at time of assessment include: severe dementia, HTN  Personal factors affecting the patient at time of IE include: inability to navigate community distances, impaired cognition, impaired safety awareness, decreased initiation and engagement, limited insight into impairments, inability/difficulty performing IADLs and inability/difficulty performing ADLs  Please locate objective findings from PT assessment regarding body systems outlined above  Upon evaluation, pt able to perform all functional mobility with modA x 2, RW, and increased time  Frequent verbal cuing provided for safety awareness, hand placement, and sequencing  Pt's S/O present during session who reports pt does not usually use DME to ambulation, however RW trialed this session d/t deconditioning  Pt requiring modA x 2 to stand statically at [facility] [place] and demonstrates significant posterior lean; unable to correct with frequent verbal and tactile cuing  Pt encouraged to attempt advancing B LEs to ambulate, however pt unable to at this time  Further mobility not attempted d/t safety concerns  The patient's AM-PAC Basic Mobility Inpatient Short Form Raw Score is 10  A Raw score of less than or equal to 16 suggests the patient may benefit from discharge to post-acute rehabilitation services  Please also refer to the recommendation of the Physical Therapist for safe discharge planning  Co treatment with OT secondary to complex medical condition of pt, possible A of 2 required to achieve and maintain transitional movements, requiring the need of skilled therapeutic intervention of 2 therapists to achieve delivery of services  Pt will benefit from continued PT intervention during LOS to address current deficits, increase LOF, and facilitate safe d/c to next level of care when medically appropriate  D/c recommendation at this time is post-acute rehabilitation services  Goals   Patient Goals none stated   LTG Expiration Date 11/15/22   Long Term Goal #1 Pt will participate in B LE strengthening exercises to facilitate improved functional activity tolerance  Pt will perform all functional transfers and bed mobility with SUP and good safety awareness  Pt will ambulate 48' with LRAD and SUP while maintaining good functional dynamic balance  Plan   Treatment/Interventions Functional transfer training;LE strengthening/ROM; Therapeutic exercise; Endurance training;Bed mobility;Gait training   PT Frequency 3-5x/wk   Recommendation   PT Discharge Recommendation Post acute rehabilitation services   AM-PAC Basic Mobility Inpatient   Turning in Bed Without Bedrails 2   Lying on Back to Sitting on Edge of Flat Bed 2   Moving Bed to Chair 2   Standing Up From Chair 2   Walk in Room 1   Climb 3-5 Stairs 1   Basic Mobility Inpatient Raw Score 10   Turning Head Towards Sound 3   Follow Simple Instructions 2   Low Function Basic Mobility Raw Score 15   Low Function Basic Mobility Standardized Score 23 9   Highest Level Of Mobility   [facility] Goal 4: Move to chair/commode STAR Achieved 4: Move to chair/commode   End of Consult   Patient Position at End of Consult Bedside chair;Bed/Chair alarm activated; All needs within reach   End of Consult Comments virtual 1:1 present

## 2022-11-01 NOTE — PLAN OF CARE
Problem: OCCUPATIONAL THERAPY ADULT  Goal: Performs self-care activities at highest level of function for planned discharge setting  See evaluation for individualized goals  Description: Treatment Interventions: ADL retraining, Functional transfer training, UE strengthening/ROM, Endurance training, Patient/family training, Equipment evaluation/education, Activityengagement          See flowsheet documentation for full assessment, interventions and recommendations  Note: Limitation: Decreased ADL status, Decreased UE strength, Decreased Safe judgement during ADL, Decreased endurance, Decreased cognition, Decreased self-care trans, Decreased high-level ADLs     Assessment: Pt is a 80 y o  male seen for OT evaluation s/p admit to [facility] on 10/27/2022 w/ Acute colitis  Comorbidities affecting pt's functional performance at time of assessment include: colitis, acute delirium, dementia, CESAR, lactic acidosis, hypokalemia, HTN  Personal factors affecting pt at time of IE include:steps to enter environment, limited home support, difficulty performing ADLS, difficulty performing IADLS , limited insight into deficits, decreased initiation and engagement  and health management   Prior to admission, pt was (A) with ADLs and IADLs with use of RW during mobility  Upon evaluation: Pt requires mod (A) x2 with use of RW during mobility 2* the following deficits impacting occupational performance: weakness, decreased strength, decreased balance, decreased tolerance, impaired initiation, impaired memory, impaired sequencing, impaired problem solving, decreased safety awareness and impaired interpersonal skills  Pt to benefit from continued skilled OT tx while in the hospital to address deficits as defined above and maximize level of functional independence w ADL's and functional mobility   Occupational Performance areas to address include: grooming, bathing/shower, toilet hygiene, dressing, functional mobility, community mobility and clothing management  The patient's raw score on the AM-PAC Daily Activity inpatient short form is 12, standardized score is 30 6, less than 39 4  Patients at this level are likely to benefit from discharge to post-acute rehabilitation services  Please refer to the recommendation of the Occupational Therapist for safe discharge planning  Pt benefited from co-evaluation of skilled OT and PT therapists in order to most appropriately address functional deficits d/t extensive assistance required for safe functional mobility, decreased activity tolerance, and regression from functioning level prior to admission and/or onset of present illness  OT/PT objectives were addressed separately; please see PT note for specific goal areas targeted       OT Discharge Recommendation: Post acute rehabilitation services

## 2022-11-01 NOTE — ASSESSMENT & PLAN NOTE
· H&P listed sepsis as one of the primary diagnoses, SIRS criteria has not presented itself so Acute Colitis is the working primary diagnosis at this time    · CT abd/pelvis showed acute colitis as likely infectious source  · Continue ceftriaxone and Flagyl due to positive blood culture results  · ID consulted and following  · Possible ischemic colitis versus infectious colitis  · Bcxs growing G+ rods, G+ cocci in pairs & chains, likely contaminants given the multitudinous types of bacteria resulting, ID considers them most likely contaminants at this time  · Obtained 2nd set of blood cultures 10/30/22  · Negative @ 24h, tomorrow will be discharged to rehab outpatient program at [facility], case management making arrangements today  · Upon discharge, change antibiotics to PO augmentin  · Acute care surgery and GI consulted, will appreciate recs  · Diet: regular

## 2022-11-01 NOTE — ASSESSMENT & PLAN NOTE
· Norvasc & losartan (replacement for off-formulary irbesartan during hospital stay)  · Restart irbesartan on discharge

## 2022-11-01 NOTE — PLAN OF CARE
Problem: PHYSICAL THERAPY ADULT  Goal: Performs mobility at highest level of function for planned discharge setting  See evaluation for individualized goals  Description: Treatment/Interventions: Functional transfer training, LE strengthening/ROM, Therapeutic exercise, Endurance training, Bed mobility, Gait training          See flowsheet documentation for full assessment, interventions and recommendations  Note: Prognosis: Guarded  Problem List: Decreased strength, Decreased endurance, Impaired balance, Decreased mobility, Decreased cognition, Decreased safety awareness, Impaired judgement  Assessment: Patient is a 80 y o  male evaluated by Physical Therapy s/p admit to [address] [place] [address] on 10/27/2022 with admitting diagnosis of: Colitis, Unresponsive, and principal problem of: Acute colitis  PT was consulted to assess patient's functional mobility and discharge needs  Ordered are PT Evaluation and treatment  Comorbidities affecting patient's physical performance at time of assessment include: severe dementia, HTN  Personal factors affecting the patient at time of IE include: inability to navigate community distances, impaired cognition, impaired safety awareness, decreased initiation and engagement, limited insight into impairments, inability/difficulty performing IADLs and inability/difficulty performing ADLs  Please locate objective findings from PT assessment regarding body systems outlined above  Upon evaluation, pt able to perform all functional mobility with modA x 2, RW, and increased time  Frequent verbal cuing provided for safety awareness, hand placement, and sequencing  Pt's S/O present during session who reports pt does not usually use DME to ambulation, however RW trialed this session d/t deconditioning  Pt requiring modA x 2 to stand statically at [facility] [place] and demonstrates significant posterior lean; unable to correct with frequent verbal and tactile cuing   Pt encouraged to attempt advancing B LEs to ambulate, however pt unable to at this time  Further mobility not attempted d/t safety concerns  The patient's AM-PAC Basic Mobility Inpatient Short Form Raw Score is 10  A Raw score of less than or equal to 16 suggests the patient may benefit from discharge to post-acute rehabilitation services  Please also refer to the recommendation of the Physical Therapist for safe discharge planning  Co treatment with OT secondary to complex medical condition of pt, possible A of 2 required to achieve and maintain transitional movements, requiring the need of skilled therapeutic intervention of 2 therapists to achieve delivery of services  Pt will benefit from continued PT intervention during LOS to address current deficits, increase LOF, and facilitate safe d/c to next level of care when medically appropriate  D/c recommendation at this time is post-acute rehabilitation services  PT Discharge Recommendation: Post acute rehabilitation services    See flowsheet documentation for full assessment

## 2022-11-01 NOTE — PROGRESS NOTES
Progress Note - General Surgery   Pedro Pizano 80 y o  male MRN: [id number]  Unit/Bed#: 419-01 Encounter: [id number]    Assessment:  Agitation overnight, was given Haldol earlier this morning  Delirium, present on admission  Acute metabolic encephalopathy  Acute colitis, likely ischemic  CT scan abdomen pelvis on admission showed acute colitis likely ischemic versus infectious colitis  Blood cultures Staphylococcus negative, g positive rods  Probable mild dehydration  Acute kidney injury present on admission now resolved, creatinine today 0 94  Hypokalemia, K 3 3  Lactic acidosis present on admission resolved with IV fluid resuscitation    Stool ordered 3 days ago for enteric bacterial panel, rotavirus, giardia lamblia, fecal leukocytes and calprotectin still not obtained as the patient has not moved his bowels  Vital signs stable, blood pressure 139/99  SpO2 on room air 94%  Afebrile 97 5  Total 24 hour urine output 800 mL  He has not had a recorded bowel movement  Plan:  No plan for any general surgical intervention at this time  Regular house diet as tolerated  Continue ID recommendations for antibiotic therapy  2nd set of blood cultures pending  If blood cultures negative then discharge to rehab versus skilled nursing facility  Recommendation from Radiology that the patient should have non urgent colonoscopy performed, can be done as an outpatient after hospital discharge once acute colitis has resolved  General surgery will follow from the periphery at this time, re-consult if needed  Personally discussed with Dr Noyola  Potassium repletion as directed by Medicine  Mallika CERDA once daily at bedtime    Subjective/Objective      Chief Complaint:  Patient is confused, limited verbal response  Subjective:  Nursing reports the patient with increased agitation and delirium overnight, was given haloperidol earlier this morning, he is arousable but can not hold conversation    Asked if he had any abdominal pain, the patient did not comprehend  No recorded bowel movement  He is incontinent of urine  Second set of blood cultures are currently pending  Discussed with the attending hospitalist, the plan is if the blood cultures negative then the patient for hospital discharge either to skilled nursing or rehab facility  He was ordered a myriad had of stool studies 3 days ago, stool yet be collected he is not moved his bowels  Nursing does not report any fever, has been stable from a cardio pulmonary standpoint      Scheduled Meds:  Current Facility-Administered Medications   Medication Dose Route Frequency Provider Last Rate   • acetaminophen  650 mg Oral Q6H PRN Nick Moreno MD     • amLODIPine  2 5 mg Oral Daily Vivi Russell MD     • divalproex sodium  125 mg Oral Q12H [facility] Vivi Russell MD     • donepezil  10 mg Oral HS Nick Moreno MD     • haloperidol lactate  2 mg Intramuscular Q6H PRN Katia Petersonol, DO     • heparin (porcine)  5,000 Units Subcutaneous Q12H [facility] Nick Moreno MD     • iohexol  100 mL Intravenous Once in imaging Nick Moreno MD     • labetalol  10 mg Intravenous Q6H PRN Katia Petersonol, DO     • losartan  100 mg Oral Daily Vivi Russell MD     • memantine  5 mg Oral Daily Nick Moreno MD     • metroNIDAZOLE  500 mg Intravenous Q8H Nick Moreno  mg (11/01/22 0200)   • ondansetron  4 mg Intravenous Q6H PRN Nick Moreno MD     • pantoprazole  40 mg Oral Daily Vivi Russell MD     • pravastatin  40 mg Oral HS Nick Moreno MD     • QUEtiapine  25 mg Oral Early Morning Nick Moreno MD     • QUEtiapine  50 mg Oral HS Nick Moreno MD     • senna-docusate sodium  1 tablet Oral HS Fabio Sheets MD     • traZODone  50 mg Oral HS Nick Moreno MD     • vancomycin  10 mg/kg Intravenous Q12H Katia Plata,  mg (11/01/22 0856)     Continuous Infusions:   PRN Meds: •  acetaminophen  •  haloperidol lactate  • iohexol  •  labetalol  •  ondansetron      Objective:     Blood pressure 139/99, pulse 78, temperature 97 5 °F (36 4 °C), resp  rate 20, height 5' 10" (1 778 m), weight 75 2 kg (165 lb 12 6 oz), SpO2 94 %  ,Body mass index is 23 79 kg/m²  I/O       10/30 0701  10/31 0700 10/31 0701 11/01 0700 11/01 0701 11/02 0700    P  O  400 620     Total Intake(mL/kg) 400 (5 4) 620 (8 2)     Urine (mL/kg/hr) 475 (0 3) 800 (0 4)     Stool  0     Total Output 475 800     Net -75 -180            Unmeasured Urine Occurrence 1 x              Invasive Devices  Report    Peripheral Intravenous Line  Duration           Peripheral IV 10/31/22 Dorsal (posterior); Right Forearm <1 day                Physical Exam:     Frail, thin  Confused and does not maintain any concentration  He is arousable but somnolent  Very little verbal response, brief answers only  He is arousable, no restraints or required  He is cooperative for exam however upon presentation to the room the patient was naked from the waist down and all of his blankets were on the floor  Skin pale, decreased turgor  Oral mucosa dry  Heart regular rate and rhythm  No murmurs heard  Lungs clear to auscultation  Good inspiratory effort  Positive bowel sounds, nondistended  The abdomen is soft  No guarding or masses  No rebound tenderness present  Liver edge is not palpable  No ascites  Inguinal sites are free of hernia  Normal male genitalia  No scrotal edema or skin changes noted  Extremities muscle wasting noted  Ambulation could not be observed  He does not follow commands  Mental status finds the patient with dementia, no tremor noted  He does recall his 1st name only      Lab, Imaging and other studies:    Contains abnormal data Blood Culture Identification Panel  Order: [id number] - Reflex for Order [id number]   Status: Preliminary result     Visible to patient: No (not released)     Next appt: None    Specimen Information: Arm, Right; Blood         0 Result Notes    Component Ref Range & Units    Staphylococcus Not Detected Detected Abnormal  P    Comment: This organism is a coagulase-negative Staphylococcus     If only 1 set of blood cultures is positive, this may represent a contaminant   Consider holding antibiotics or repeating cultures prior to starting antibiotics        If >1 one set of blood cultures is positive, vancomycin is the preferred therapy  CMP:   Lab Results   Component Value Date    SODIUM 142 11/01/2022    K 3 3 (L) 11/01/2022     11/01/2022    CO2 26 11/01/2022    BUN 12 11/01/2022    CREATININE 0 94 11/01/2022    CALCIUM 8 5 11/01/2022    EGFR 72 11/01/2022     VTE Pharmacologic Prophylaxis: Heparin  VTE Mechanical Prophylaxis: reason for no mechanical VTE prophylaxis Patient agitation and cooperation       Luh Yung PA-C

## 2022-11-01 NOTE — QUICK NOTE
Contacted patient's granddaughter by phone and updated her as to plan of care  Explained the need for p o  Antibiotics outpatient as well as the likelihood of a discharge to rehabilitation at [facility] tomorrow  Granddaughter voiced understanding and appreciation of the plan    -Cleopatra Wilks

## 2022-11-01 NOTE — OCCUPATIONAL THERAPY NOTE
Occupational Therapy Evaluation     Patient Name: Ginger Grayson  NLPTY'L Date: 11/1/2022  Problem List  Principal Problem:    Acute colitis  Active Problems:    Lactic acidosis    Acute delirium    Severe dementia with agitation    Acute kidney injury ([address] )    Hypokalemia    Primary hypertension    Past Medical History  Past Medical History:   Diagnosis Date    Hypertension      Past Surgical History  History reviewed  No pertinent surgical history  11/01/22 1431   OT Last Visit   OT Visit Date 11/01/22   Note Type   Note type Evaluation   Pain Assessment   Pain Score No Pain   Restrictions/Precautions   Weight Bearing Precautions Per Order No   Other Precautions Multiple lines; Fall Risk;Telemetry; Chair Alarm   Home Living   Type of Home Assisted living   [address] [place]   Prior Function   Level of [place] Needs assistance with functional mobility; Needs assistance with ADLs; Needs assistance with IADLS   Lives With Spouse   IADLs Family/Friend/Other provides transportation; Family/Friend/Other provides medication management; Family/Friend/Other provides meals   Falls in the last 6 months 0   Vocational Retired   Subjective   Subjective "I feel like it's going like this"   ADL   Where Assessed Chair   LB Dressing Assistance 2  Maximal Assistance   LB Dressing Deficit Don/doff R sock; Don/doff L sock   Toileting Assistance  2  Maximal Assistance   Toileting Deficit Clothing management up;Clothing management down   Bed Mobility   Additional Comments pt on RA during session; SPO2 WFL with no complaints of SOB   Transfers   Sit to Stand 3  Moderate assistance   Additional items Increased time required;Assist x 2   Stand to Sit 3  Moderate assistance   Additional items Increased time required;Assist x 2   Additional Comments pt performs functional transfers with mod (A) x2 level x2 attempts; pt with significant posterior lean and use of RW; significant cues required for stability   Functional Mobility Additional Comments pt not appropriate to perform functional mobility   Balance   Static Sitting Fair   Dynamic Sitting Fair   Static Standing Fair -   Dynamic Standing Poor +   Ambulatory Poor   Activity Tolerance   Activity Tolerance Patient limited by fatigue   RUE Assessment   RUE Assessment WFL   LUE Assessment   LUE Assessment WFL   Hand Function   Gross Motor Coordination Functional   Fine Motor Coordination Functional   Sensation   Light Touch No apparent deficits   Sharp/Dull No apparent deficits   Psychosocial   Psychosocial (WDL) X   Patient Behaviors/Mood Brightens with approach   Cognition   Overall Cognitive Status Impaired   Arousal/Participation Alert   Attention Attends with cues to redirect   Orientation Level Oriented to person;Disoriented to place; Disoriented to situation;Disoriented to time   Memory Decreased long term memory;Decreased short term memory   Following Commands Follows one step commands with increased time or repetition   Assessment   Limitation Decreased ADL status; Decreased UE strength;Decreased Safe judgement during ADL;Decreased endurance;Decreased cognition;Decreased self-care trans;Decreased high-level ADLs   Assessment Pt is a 80 y o  male seen for OT evaluation s/p admit to [facility] on 10/27/2022 w/ Acute colitis  Comorbidities affecting pt's functional performance at time of assessment include: colitis, acute delirium, dementia, CESAR, lactic acidosis, hypokalemia, HTN  Personal factors affecting pt at time of IE include:steps to enter environment, limited home support, difficulty performing ADLS, difficulty performing IADLS , limited insight into deficits, decreased initiation and engagement  and health management   Prior to admission, pt was (A) with ADLs and IADLs with use of RW during mobility   Upon evaluation: Pt requires mod (A) x2 with use of RW during mobility 2* the following deficits impacting occupational performance: weakness, decreased strength, decreased balance, decreased tolerance, impaired initiation, impaired memory, impaired sequencing, impaired problem solving, decreased safety awareness and impaired interpersonal skills  Pt to benefit from continued skilled OT tx while in the hospital to address deficits as defined above and maximize level of functional independence w ADL's and functional mobility  Occupational Performance areas to address include: grooming, bathing/shower, toilet hygiene, dressing, functional mobility, community mobility and clothing management  The patient's raw score on the AM-PAC Daily Activity inpatient short form is 12, standardized score is 30 6, less than 39 4  Patients at this level are likely to benefit from discharge to post-acute rehabilitation services  Please refer to the recommendation of the Occupational Therapist for safe discharge planning  Pt benefited from co-evaluation of skilled OT and PT therapists in order to most appropriately address functional deficits d/t extensive assistance required for safe functional mobility, decreased activity tolerance, and regression from functioning level prior to admission and/or onset of present illness  OT/PT objectives were addressed separately; please see PT note for specific goal areas targeted  Goals   Patient Goals to go home   Short Term Goal  pt will perform UE strengthening exercises   Long Term Goal #1 pt will demonstrate toilet transfers and hygiene at min (A) level   Long Term Goal #2 pt will demonstrate functional mobility with RW at min (A) level   Long Term Goal pt will perform UB bathing and grooming tasks at min (A) level   Plan   Treatment Interventions ADL retraining;Functional transfer training;UE strengthening/ROM; Endurance training;Patient/family training;Equipment evaluation/education; Activityengagement   Goal Expiration Date 11/15/22   OT Frequency 3-5x/wk   Recommendation   OT Discharge Recommendation Post acute rehabilitation services   [facility] Daily Activity Inpatient Lower Body Dressing 2   Bathing 2   Toileting 2   Upper Body Dressing 2   Grooming 2   Eating 2   Daily Activity Raw Score 12   Daily Activity Standardized Score (Calc for Raw Score >=11) 30 6   AM-PAC Applied Cognition Inpatient   Following a Speech/Presentation 3   Understanding Ordinary Conversation 3   Taking Medications 1   Remembering Where Things Are Placed or Put Away 1   Remembering List of 4-5 Errands 1   Taking Care of Complicated Tasks 1   Applied Cognition Raw Score 10   Applied Cognition Standardized Score 24 98

## 2022-11-02 ENCOUNTER — HOME CARE VISIT (OUTPATIENT)
Dept: HOME HEALTH SERVICES | Facility: HOME HEALTHCARE | Age: 87
End: 2022-11-02

## 2022-11-02 ENCOUNTER — HOSPICE ADMISSION (OUTPATIENT)
Dept: HOME HOSPICE | Facility: HOSPICE | Age: 87
End: 2022-11-02

## 2022-11-02 LAB
ANION GAP SERPL CALCULATED.3IONS-SCNC: 10 MMOL/L (ref 4–13)
BUN SERPL-MCNC: 14 MG/DL (ref 5–25)
CALCIUM SERPL-MCNC: 8.7 MG/DL (ref 8.3–10.1)
CHLORIDE SERPL-SCNC: 108 MMOL/L (ref 96–108)
CO2 SERPL-SCNC: 24 MMOL/L (ref 21–32)
CREAT SERPL-MCNC: 1.04 MG/DL (ref 0.6–1.3)
GFR SERPL CREATININE-BSD FRML MDRD: 63 ML/MIN/1.73SQ M
GLUCOSE SERPL-MCNC: 101 MG/DL (ref 65–140)
POTASSIUM SERPL-SCNC: 3.6 MMOL/L (ref 3.5–5.3)
SODIUM SERPL-SCNC: 142 MMOL/L (ref 135–147)
VANCOMYCIN TROUGH SERPL-MCNC: 18.4 UG/ML (ref 10–20)

## 2022-11-02 RX ORDER — QUETIAPINE FUMARATE 25 MG/1
75 TABLET, FILM COATED ORAL
Status: DISCONTINUED | OUTPATIENT
Start: 2022-11-02 | End: 2022-11-04 | Stop reason: HOSPADM

## 2022-11-02 RX ORDER — BISACODYL 10 MG
10 SUPPOSITORY, RECTAL RECTAL DAILY
Status: DISCONTINUED | OUTPATIENT
Start: 2022-11-02 | End: 2022-11-04 | Stop reason: HOSPADM

## 2022-11-02 RX ADMIN — DONEPEZIL HYDROCHLORIDE 10 MG: 5 TABLET ORAL at 21:00

## 2022-11-02 RX ADMIN — VANCOMYCIN HYDROCHLORIDE 1250 MG: 5 INJECTION, POWDER, LYOPHILIZED, FOR SOLUTION INTRAVENOUS at 05:47

## 2022-11-02 RX ADMIN — HEPARIN SODIUM 5000 UNITS: 5000 INJECTION INTRAVENOUS; SUBCUTANEOUS at 20:06

## 2022-11-02 RX ADMIN — QUETIAPINE FUMARATE 25 MG: 25 TABLET ORAL at 05:47

## 2022-11-02 RX ADMIN — PRAVASTATIN SODIUM 40 MG: 40 TABLET ORAL at 21:00

## 2022-11-02 RX ADMIN — METRONIDAZOLE 500 MG: 5 INJECTION, SOLUTION INTRAVENOUS at 02:37

## 2022-11-02 RX ADMIN — PANTOPRAZOLE SODIUM 40 MG: 40 TABLET, DELAYED RELEASE ORAL at 10:04

## 2022-11-02 RX ADMIN — VANCOMYCIN HYDROCHLORIDE 1250 MG: 5 INJECTION, POWDER, LYOPHILIZED, FOR SOLUTION INTRAVENOUS at 18:15

## 2022-11-02 RX ADMIN — QUETIAPINE FUMARATE 75 MG: 25 TABLET ORAL at 20:06

## 2022-11-02 RX ADMIN — TRAZODONE HYDROCHLORIDE 50 MG: 50 TABLET ORAL at 21:00

## 2022-11-02 RX ADMIN — MEMANTINE 5 MG: 5 TABLET ORAL at 10:04

## 2022-11-02 RX ADMIN — METRONIDAZOLE 500 MG: 5 INJECTION, SOLUTION INTRAVENOUS at 10:05

## 2022-11-02 RX ADMIN — BISACODYL 10 MG: 10 SUPPOSITORY RECTAL at 23:00

## 2022-11-02 RX ADMIN — AMLODIPINE BESYLATE 2.5 MG: 2.5 TABLET ORAL at 10:04

## 2022-11-02 RX ADMIN — METRONIDAZOLE 500 MG: 5 INJECTION, SOLUTION INTRAVENOUS at 20:00

## 2022-11-02 RX ADMIN — SENNOSIDES AND DOCUSATE SODIUM 1 TABLET: 50; 8.6 TABLET ORAL at 21:00

## 2022-11-02 RX ADMIN — DIVALPROEX SODIUM 125 MG: 125 CAPSULE, COATED PELLETS ORAL at 10:04

## 2022-11-02 RX ADMIN — DIVALPROEX SODIUM 125 MG: 125 CAPSULE, COATED PELLETS ORAL at 20:06

## 2022-11-02 RX ADMIN — LOSARTAN POTASSIUM 100 MG: 50 TABLET, FILM COATED ORAL at 20:06

## 2022-11-02 RX ADMIN — HEPARIN SODIUM 5000 UNITS: 5000 INJECTION INTRAVENOUS; SUBCUTANEOUS at 10:04

## 2022-11-02 NOTE — ACP (ADVANCE CARE PLANNING)
Serious Illness Conversation    1  What is your understanding now of where you are with your illness? Prognostic Understanding: no understanding of prognosis     2  How much information about what is likely to be ahead with your illness would you like to have? N/A     3  What did you (clinician) communicate to the patient? Prognostic Communication: Uncertain - It can be difficult to predict what will happen with your illness  I hope you will continue to live well for a long time but I’m worried that you could get sick quickly, and I think it is important to prepare for that possibility  4  If your health situation worsens, what are your most important goals? Goals: be physically comfortable, be at home     5  What are the biggest fears and worries about the future and your health? N/A     6  What abilities are so critical to your life that you cannot imagine living without them? N/A     7  What gives you strength as you think about the future with your illness? N/A     8  If you become sicker, how much are you willing to go through for the possibility of gaining more time? N/A    9  How much does your proxy and family know about your priorities and wishes? Discussion Discussion: extensive discussion with family about goals and wishes  Patient's current health care proxy is his granddaughter Crow Guevara  Case was discussed at length with Crow Guevara and patient's wife as patient is not competent to make his own medical decisions due to advanced dementia  Currently residing at [facility] and has significant sundownings throughout the night  Patient's family has noticed that he has had a decline over this last year with memory, independency, ADLs, and has become more weak  They are afraid that patient is going to continue to require frequent hospitalizations   At this time, they want to consider hospice at [facility] to ensure comfort and that patient does not return to the hospital  I discussed with family if patient had ever discussed wishes in the past and daughter states that he had a hospitalization where he required restraints and requested never to be hospitalized again  Patient did require restraints this hospitalization and family does not want him to continue to go through this  They have a meeting with Hospice liaison tomorrow to make final decision  Should they decide again hospice, patient would then go to rehab  Either way, he will complete abx for colitis  Currently patient is a DNR/DNI        I have spent 30 minutes speaking with my patient on advanced care planning today or during this visit     Advanced directives

## 2022-11-02 NOTE — PROGRESS NOTES
[facility] [address]  Progress Note - Clara Hamper 11/7/1933, 80 y o  male MRN: [id number]  Unit/Bed#: 222-67 Encounter: [id number]  Primary Care Provider: Maxine Manuel DO   Date and time admitted to hospital: 10/27/2022 10:38 PM    * Acute colitis  Assessment & Plan  · H&P listed sepsis as one of the primary diagnoses, SIRS criteria has not presented itself so Acute Colitis is the working primary diagnosis at this time  · CT abd/pelvis showed acute colitis as likely infectious source  · Continue ceftriaxone and Flagyl due to positive blood culture results  · ID consulted and following  · Possible ischemic colitis versus infectious colitis  · Bcxs growing G+ rods, G+ cocci in pairs & chains, likely contaminants given the multitudinous types of bacteria resulting, ID considers them most likely contaminants at this time  · Obtained 2nd set of blood cultures 10/30/22  · Negative @ 48  · Upon discharge, change antibiotics to PO augmentin  · Acute care surgery and GI consulted, will appreciate recs  · Diet: regular    Goals of care held with family  They are considering hospice for patient  Hospice liaison conversation to be held tomorrow and will make further discharge planning after this  See ACP note for further information    Severe dementia with agitation  Assessment & Plan  · Continue aricept and namenda  · Reorient as needed  · Maintain good sleep-wake cycle  · See acute delirium A&P    Acute kidney injury [facility])  Assessment & Plan  Acute kidney injury was present on admission, resolved      Acute delirium  Assessment & Plan  Acute metabolic encephalopathy, present on admission, as evidenced by increased confusion per the nursing facility, transient hypotension, patient does have underlying dementia   Confusion has been minimal for the past few days during the day  · Haloperidol prn for aggression  · Virtual observer in place  · Per family discussion, will increase night time seroquel from 50 mg to 75 mg and move up timing to match when his  occurs    Primary hypertension  Assessment & Plan  · Norvasc & losartan (replacement for off-formulary irbesartan during hospital stay)  · Restart irbesartan on discharge        VTE Pharmacologic Prophylaxis: VTE Score: 8 High Risk (Score >/= 5) - Pharmacological DVT Prophylaxis Ordered: heparin  Sequential Compression Devices Ordered  Patient Centered Rounds: I performed bedside rounds with nursing staff today  Discussions with Specialists or Other Care Team Provider: case management    Education and Discussions with Family / Patient: Updated  (wife and daughter) at bedside  Time Spent for Care: 60 minutes  More than 50% of total time spent on counseling and coordination of care as described above  Current Length of Stay: 5 day(s)  Current Patient Status: Inpatient   Certification Statement: The patient will continue to require additional inpatient hospital stay due to discharge planning  Discharge Plan: Anticipate discharge tomorrow to hospice vs rehab    Code Status: Level 3 - DNAR and DNI    Subjective:   Patient without any complaints  Still with confabulation and confusion but pleasant    Objective:     Vitals:   Temp (24hrs), Av 8 °F (36 6 °C), Min:97 8 °F (36 6 °C), Max:97 8 °F (36 6 °C)    Temp:  [97 8 °F (36 6 °C)] 97 8 °F (36 6 °C)  HR:  [72-79] 72  Resp:  [19] 19  BP: (149-176)/() 149/91  SpO2:  [94 %-96 %] 94 %  Body mass index is 23 66 kg/m²  Input and Output Summary (last 24 hours): Intake/Output Summary (Last 24 hours) at 2022 1626  Last data filed at 2022 1201  Gross per 24 hour   Intake 360 ml   Output 250 ml   Net 110 ml       Physical Exam:   Physical Exam  Vitals and nursing note reviewed  Constitutional:       General: He is not in acute distress  Appearance: He is ill-appearing  HENT:      Head: Normocephalic and atraumatic     Cardiovascular:      Rate and Rhythm: Normal rate and regular rhythm  Pulses: Normal pulses  Heart sounds: Normal heart sounds  Pulmonary:      Effort: Pulmonary effort is normal       Breath sounds: Normal breath sounds  Abdominal:      General: Bowel sounds are normal       Palpations: Abdomen is soft  Musculoskeletal:      Cervical back: Normal range of motion and neck supple  Right lower leg: No edema  Left lower leg: No edema  Neurological:      Mental Status: He is alert  Mental status is at baseline  He is disoriented  Psychiatric:         Mood and Affect: Mood normal          Behavior: Behavior normal           Additional Data:     Labs:  Results from last 7 days   Lab Units 10/31/22  0627   WBC Thousand/uL 6 94   HEMOGLOBIN g/dL 13 7   HEMATOCRIT % 40 7   PLATELETS Thousands/uL 163   NEUTROS PCT % 63   LYMPHS PCT % 20   MONOS PCT % 13*   EOS PCT % 3     Results from last 7 days   Lab Units 11/02/22  0448 10/31/22  0627 10/30/22  0457   SODIUM mmol/L 142   < > 142   POTASSIUM mmol/L 3 6   < > 3 5   CHLORIDE mmol/L 108   < > 104   CO2 mmol/L 24   < > 30   BUN mg/dL 14   < > 7   CREATININE mg/dL 1 04   < > 1 02   ANION GAP mmol/L 10   < > 8   CALCIUM mg/dL 8 7   < > 8 9   ALBUMIN g/dL  --   --  3 1*   TOTAL BILIRUBIN mg/dL  --   --  0 59   ALK PHOS U/L  --   --  64   ALT U/L  --   --  13   AST U/L  --   --  25   GLUCOSE RANDOM mg/dL 101   < > 88    < > = values in this interval not displayed  Results from last 7 days   Lab Units 10/30/22  0457   INR  1 18             Results from last 7 days   Lab Units 10/30/22  0457 10/28/22  0524 10/28/22  0142   LACTIC ACID mmol/L  --  2 0 6 1*   PROCALCITONIN ng/ml <0 05  --   --        Lines/Drains:  Invasive Devices  Report    Peripheral Intravenous Line  Duration           Peripheral IV 11/02/22 Dorsal (posterior); Left Forearm <1 day                      Imaging: No pertinent imaging reviewed      Recent Cultures (last 7 days):   Results from last 7 days   Lab Units 10/30/22  1022 10/28/22  0138   BLOOD CULTURE  No Growth at 72 hrs  No Growth at 72 hrs  Staphylococcus pettenkoferi*  Staphylococcus*  Corynebacterium glucuronolyticum*  Bacillus thuringiensis*  Staphylococcus pettenkoferi*   GRAM STAIN RESULT   --  Gram positive rods*  Gram positive cocci in pairs and chains*  Gram positive cocci in pairs*       Last 24 Hours Medication List:   Current Facility-Administered Medications   Medication Dose Route Frequency Provider Last Rate   • acetaminophen  650 mg Oral Q6H PRN Eulalio Merlos MD     • amLODIPine  2 5 mg Oral Daily Marilyne Kayser, MD     • divalproex sodium  125 mg Oral Q12H [address] Marilyne Kayser, MD     • donepezil  10 mg Oral HS Eulalio Merlos MD     • haloperidol lactate  2 mg Intramuscular Q6H PRN Karlie Plata DO     • heparin (porcine)  5,000 Units Subcutaneous Q12H [address] Eulalio Merlos MD     • iohexol  100 mL Intravenous Once in imaging Eulalio Merlos MD     • labetalol  10 mg Intravenous Q6H PRN Karlie Plata DO     • losartan  100 mg Oral Daily Marilyne Kayser, MD     • memantine  5 mg Oral Daily Eulalio Merlos MD     • metroNIDAZOLE  500 mg Intravenous Q8H Eulalio Merlos  mg (11/02/22 1005)   • ondansetron  4 mg Intravenous Q6H PRN Eulalio Merlos MD     • pantoprazole  40 mg Oral Daily Marilyne Kayser, MD     • pravastatin  40 mg Oral HS Eulalio Merlos MD     • QUEtiapine  25 mg Oral Early Morning Eulalio Merlos MD     • QUEtiapine  75 mg Oral HS Emy Huang PA-C     • senna-docusate sodium  1 tablet Oral HS Fabio Brooks MD     • traZODone  50 mg Oral HS Eulalio Merlos MD     • vancomycin  1,250 mg Intravenous Q12H Fabio Brooks MD 1,250 mg (11/02/22 6755)        Today, Patient Was Seen By: Emy Huang    **Please Note: This note may have been constructed using a voice recognition system  **

## 2022-11-02 NOTE — HOSPICE NOTE
Received message that family would like an informational session  I called Rhonda Shahbazdipti, granddaughter, and she requested 10 am meeting tomorrow at the [address] updated

## 2022-11-02 NOTE — PLAN OF CARE
Problem: PAIN - ADULT  Goal: Verbalizes/displays adequate comfort level or baseline comfort level  Description: Interventions:  - Encourage patient to monitor pain and request assistance  - Assess pain using appropriate pain scale (0-10 pain scale)  - Administer analgesics based on type and severity of pain and evaluate response  - Implement non-pharmacological measures as appropriate and evaluate response  - Consider cultural and social influences on pain and pain management  - Notify physician/advanced practitioner if interventions unsuccessful or patient reports new pain  Outcome: Progressing     Problem: INFECTION - ADULT  Goal: Absence or prevention of progression during hospitalization  Description: INTERVENTIONS:  - Assess and monitor for signs and symptoms of infection  - Monitor lab/diagnostic results  - Administer medications as ordered  - Instruct and encourage patient and family to use good hand hygiene technique  Outcome: Progressing     Problem: SAFETY ADULT  Goal: Patient will remain free of falls  Description: INTERVENTIONS:  - Educate patient/family on patient safety including physical limitations  - Instruct patient to call for assistance with activity (min assist)  - Consult OT/PT to assist with strengthening/mobility   - Keep Call bell within reach  - Keep bed low and locked with side rails adjusted as appropriate  - Keep care items and personal belongings within reach  - Initiate and maintain comfort rounds  - Make Fall Risk Sign visible to staff (high fall risk)  - Offer Toileting every 1-2 Hours, in advance of need  - Initiate/Maintain bed/chair alarm  - Obtain necessary fall risk management equipment: nonskid footwear, call bell within reach  - Apply yellow socks and bracelet for high fall risk patients  - Consider moving patient to room near nurses station  Outcome: Progressing  Goal: Maintain or return to baseline ADL function  Description: INTERVENTIONS:  -  Assess patient's ability to carry out ADLs; (mod to max assist)  - Assess/evaluate cause of self-care deficits (confusion, fatigue, limited movement)  - Assess range of motion  - Assess patient's mobility; (min assist)  - Assess patient's need for assistive devices and provide as appropriate  - Encourage maximum independence but intervene and supervise when necessary  - Involve family in performance of ADLs  - Assess for home care needs following discharge   - Consider OT consult to assist with ADL evaluation and planning for discharge  - Provide patient education as appropriate  Outcome: Progressing     Problem: DISCHARGE PLANNING  Goal: Discharge to home or other facility with appropriate resources  Description: INTERVENTIONS:  - Identify barriers to discharge w/patient and caregiver  - Arrange for needed discharge resources and transportation as appropriate  - Identify discharge learning needs (meds, wound care, etc )  - Refer to Case Management Department for coordinating discharge planning if the patient needs post-hospital services based on physician/advanced practitioner order or complex needs related to functional status, cognitive ability, or social support system  Outcome: Progressing     Problem: Knowledge Deficit  Goal: Patient/family/caregiver demonstrates understanding of disease process, treatment plan, medications, and discharge instructions  Description: Complete learning assessment and assess knowledge base    Interventions:  - Provide teaching at level of understanding  - Provide teaching via preferred learning methods  Outcome: Progressing

## 2022-11-02 NOTE — CASE MANAGEMENT
Case Management Discharge Planning Note    Patient name Kiara Long  Location /575-34 MRN [id number]  : 1933 Date 2022       Current Admission Date: 10/27/2022  Current Admission Diagnosis:Acute colitis   Patient Active Problem List    Diagnosis Date Noted   • Acute colitis 10/28/2022   • Lactic acidosis 10/28/2022   • Acute delirium 10/28/2022   • Severe dementia with agitation 10/28/2022   • Acute kidney injury ([facility] [address] ) 10/28/2022   • Hypokalemia 10/28/2022   • Primary hypertension 10/28/2022      LOS (days): 5  Geometric Mean LOS (GMLOS) (days): 4 30  Days to GMLOS:-1 2     OBJECTIVE:  Risk of Unplanned Readmission Score: 17         Current admission status: Inpatient   Preferred Pharmacy:   [address] [place] [address], [address] [facility] [address] [place] [id number]  Phone: [phone] Fax: [phone]    Primary Care Provider: Caterina Orellana DO    Primary Insurance: MEDICARE  Secondary Insurance: [facility]    DISCHARGE DETAILS:  I was notified by physician that family would like a hospice referral for a hospice information session  Referral was made to [facility] and I notified Hospice liaison Felipe of same

## 2022-11-02 NOTE — PHYSICAL THERAPY NOTE
PHYSICAL THERAPY NOTE          Patient Name: Cherylene Mcbride YOKEE'R Date: 11/2/2022 11/02/22 5733   PT Last Visit   PT Visit Date 11/02/22   Note Type   Note Type Treatment   Restrictions/Precautions   Weight Bearing Precautions Per Order No   Other Precautions   (Multiple lines; Fall Risk; Telemetry; Chair Alarm)   General   Response to Previous Treatment Patient unable to report, no changes reported from family or staff   Family/Caregiver Present No   Cognition   Overall Cognitive Status Impaired   Arousal/Participation Responsive   Orientation Level Oriented to person;Disoriented to place; Disoriented to time;Disoriented to situation   Following Commands Follows one step commands with increased time or repetition   Subjective   Subjective Pt slow to arrouse  Eyes closed frequently throughout session  Bed Mobility   Rolling R 3  Moderate assistance   Additional items Assist x 2;Bedrails; Increased time required;Verbal cues   Rolling L 3  Moderate assistance   Additional items Assist x 2;Bedrails; Increased time required;Verbal cues   Supine to Sit 3  Moderate assistance   Additional items Assist x 2;HOB elevated; Bedrails; Increased time required;Verbal cues;LE management   Additional Comments Increased time to transition to EOB  Sat unsupported with fair sitting balance  No c/o dizziness   Transfers   Sit to Stand 3  Moderate assistance   Additional items Assist x 2; Increased time required;Verbal cues   Stand to Sit 3  Moderate assistance   Additional items Assist x 2;Armrests; Increased time required;Verbal cues   Stand pivot 3  Moderate assistance   Additional items Assist x 2; Increased time required;Verbal cues   Additional Comments SPT bed to chair  Stood with poor+ balance for hygiene due to incontinence     Balance   Static Sitting Fair   Dynamic Sitting Fair   Static Standing Fair -   Dynamic Standing Poor +  (RW)   Endurance Deficit   Endurance Deficit Yes   Activity Tolerance   Activity Tolerance Patient limited by fatigue   Exercises   Heelslides Supine;15 reps;AAROM;PROM; Bilateral   Hip Abduction Supine;15 reps;PROM;AAROM   Hip Adduction Supine;15 reps;PROM;AAROM   Ankle Pumps Supine;15 reps;PROM;AAROM; Bilateral   Assessment   Prognosis Guarded   Problem List   (Decreased strength; Decreased endurance; Impaired balance; Decreased mobility; Decreased cognition; Decreased safety awareness; Impaired judgement)   Assessment Pt  seen for PT treatment session this date with interventions consisting of  therapeutic exercises, bed mobility, transfers  w/ emphasis on improving pt's mobility  Pt  Requiring max cues for sequence and safety  In comparison to previous session, Pt  With improvements in activity tolerance  Tx OOB to chair mod x 2 with use of RW  Decreased posterior lean upon standing  Pt is in need of continued activity in PT to improve strength balance endurance mobility transfers and ambulation with return to maximize LOF  From PT/mobility standpoint, recommendation at time of d/c would be post acute rehab  in order to promote return to PLOF and independence  The patient's AM-[facility] Basic Mobility Inpatient Short Form Raw Score is 10  A Raw score of less than or equal to 16 suggests the patient may benefit from discharge to post-acute rehabilitation services  Please also refer to physical therapy recommendation for safe DC planning  Goals   LTG Expiration Date 11/15/22   PT Treatment Day 1   Plan   Treatment/Interventions   (Functional transfer training; LE strengthening/ROM; Therapeutic exercise;  Endurance training; Bed mobility; Gait training)   Progress Slow progress, decreased activity tolerance   PT Frequency 3-5x/wk   Recommendation   PT Discharge Recommendation Post acute rehabilitation services   AM-PAC Basic Mobility Inpatient   Turning in Bed Without Bedrails 2   Lying on Back to Sitting on Edge of Flat Bed 2 Moving Bed to Chair 2   Standing Up From Chair 2   Walk in Room 1   Climb 3-5 Stairs 1   Basic Mobility Inpatient Raw Score 10   Turning Head Towards Sound 3   Follow Simple Instructions 2   Low Function Basic Mobility Raw Score 15   Low Function Basic Mobility Standardized Score 23 9   Highest Level Of Mobility   -HLM Goal 4: Move to chair/commode   JH-HLM Achieved 4: Move to chair/commode   Education   Education Provided Mobility training   Patient Reinforcement needed   End of Consult   Patient Position at End of Consult Bedside chair;Bed/Chair alarm activated; All needs within reach   End of Consult Comments discussed POC with PT

## 2022-11-02 NOTE — PROGRESS NOTES
Vancomycin IV Pharmacy-to-Dose Consultation    Ashish Talbot is a 80 y o  male who is currently receiving Vancomycin IV with management by the Pharmacy Consult service  Assessment/Plan:  The patient was reviewed  Renal function is stable and no signs or symptoms of nephrotoxicity and/or infusion reactions were documented in the chart  Based on today’s assessment, continue current vancomycin (day # 4) dosing of 1250mg Q12H, with a plan for trough to be drawn  prior to this evenings dose  We will continue to follow the patient’s culture results and clinical progress daily      Lilian Segura

## 2022-11-02 NOTE — ASSESSMENT & PLAN NOTE
Acute metabolic encephalopathy, present on admission, as evidenced by increased confusion per the nursing facility, transient hypotension, patient does have underlying dementia   Confusion has been minimal for the past few days during the day  · Haloperidol prn for aggression  · Virtual observer in place  · Per family discussion, will increase night time seroquel from 50 mg to 75 mg and move up timing to match when his sundownings occurs

## 2022-11-02 NOTE — ASSESSMENT & PLAN NOTE
· H&P listed sepsis as one of the primary diagnoses, SIRS criteria has not presented itself so Acute Colitis is the working primary diagnosis at this time  · CT abd/pelvis showed acute colitis as likely infectious source  · Continue ceftriaxone and Flagyl due to positive blood culture results  · ID consulted and following  · Possible ischemic colitis versus infectious colitis  · Bcxs growing G+ rods, G+ cocci in pairs & chains, likely contaminants given the multitudinous types of bacteria resulting, ID considers them most likely contaminants at this time  · Obtained 2nd set of blood cultures 10/30/22  · Negative @ 48  · Upon discharge, change antibiotics to PO augmentin  · Acute care surgery and GI consulted, will appreciate recs  · Diet: regular    Goals of care held with family  They are considering hospice for patient  Hospice liaison conversation to be held tomorrow and will make further discharge planning after this   See ACP note for further information

## 2022-11-02 NOTE — Clinical Note
Craig Remy 81yo male  11 1933 currently at [facility] but resides at [facility]  Hx Dementia  Presented with acute colitis  CT abd/pelvis showed acute colitis as likely infectious source  Was started on antibiotics  General surgery was consulted and that patient have colonoscopy as outpatient  Havine sundowners and agitation  Still able to walk with 2 assist  Albumin 3 1  requires assistance with all his ADLs  PPS 40  Weight on 10 2022 166 lbs current weight 164 lbs  Approve, RLOC?

## 2022-11-02 NOTE — PROGRESS NOTES
Vancomycin Assessment    Tyree Schmitz is a 80 y o  male who is currently receiving vancomycin 1250mg Q12H for bacteremia     Relevant clinical data and objective history reviewed:  Creatinine   Date Value Ref Range Status   11/02/2022 1 04 0 60 - 1 30 mg/dL Final     Comment:     Standardized to IDMS reference method   11/01/2022 0 94 0 60 - 1 30 mg/dL Final     Comment:     Standardized to IDMS reference method   10/31/2022 0 97 0 60 - 1 30 mg/dL Final     Comment:     Standardized to IDMS reference method     /91   Pulse 72   Temp 97 8 °F (36 6 °C) (Temporal)   Resp 19   Ht 5' 10" (1 778 m)   Wt 74 8 kg (164 lb 14 5 oz)   SpO2 94%   BMI 23 66 kg/m²   I/O last 3 completed shifts: In: 420 [P O :420]  Out: 1050 [Urine:1050]  Lab Results   Component Value Date/Time    BUN 14 11/02/2022 04:48 AM    WBC 6 94 10/31/2022 06:27 AM    HGB 13 7 10/31/2022 06:27 AM    HCT 40 7 10/31/2022 06:27 AM    MCV 90 10/31/2022 06:27 AM     10/31/2022 06:27 AM     Temp Readings from Last 3 Encounters:   11/02/22 97 8 °F (36 6 °C) (Temporal)   10/25/22 98 8 °F (37 1 °C) (Temporal)   11/23/21 98 6 °F (37 °C) (Tympanic)     Vancomycin Days of Therapy: 6    Assessment/Plan  The patient is currently on vancomycin utilizing scheduled dosing  Baseline risks associated with therapy include: advanced age and dehydration  The patient is receiving 1250mg Q12H with the most recent vancomycin level being 18 4 at steady-state and therapeutic based on a goal of 15-20 (appropriate for most indications) ; therefore, is clinically appropriate and dose will be continued   Pharmacy will continue to follow closely for s/sx of nephrotoxicity, infusion reactions, and appropriateness of therapy  BMP and CBC will be ordered per protocol  Plan for next trough  at approximately 0500 on 11/04/22  Pharmacy will continue to follow the patient’s culture results and clinical progress daily      Raul Chairez, Pharmacist

## 2022-11-02 NOTE — CASE MANAGEMENT
Case Management Discharge Planning Note    Patient name Bo Wright  Location /[id number] MRN [id number]  : 1933 Date 2022       Current Admission Date: 10/27/2022  Current Admission Diagnosis:Acute colitis   Patient Active Problem List    Diagnosis Date Noted   • Acute colitis 10/28/2022   • Lactic acidosis 10/28/2022   • Acute delirium 10/28/2022   • Severe dementia with agitation 10/28/2022   • Acute kidney injury ([address] ) 10/28/2022   • Hypokalemia 10/28/2022   • Primary hypertension 10/28/2022      LOS (days): 5  Geometric Mean LOS (GMLOS) (days): 4 30  Days to GMLOS:-1 2     OBJECTIVE:  Risk of Unplanned Readmission Score: 17         Current admission status: Inpatient   Preferred Pharmacy:   [facility], [address] [facility] [address] [facility] [address] [address]  Phone: [phone] Fax: [phone]    Primary Care Provider: China Lockhart DO    Primary Insurance: MEDICARE  Secondary Insurance: [facility]    DISCHARGE DETAILS:  [address] liaison is going to meet with patient's grand daughter and patient's wife 11/3/22 at [employer] at 10 am

## 2022-11-02 NOTE — ASSESSMENT & PLAN NOTE
· Continue aricept and namenda  · Reorient as needed  · Maintain good sleep-wake cycle  · See acute delirium A&P

## 2022-11-02 NOTE — PLAN OF CARE
Problem: PHYSICAL THERAPY ADULT  Goal: Performs mobility at highest level of function for planned discharge setting  See evaluation for individualized goals  Description: Treatment/Interventions: Functional transfer training, LE strengthening/ROM, Therapeutic exercise, Endurance training, Bed mobility, Gait training          See flowsheet documentation for full assessment, interventions and recommendations  Outcome: Progressing  Note: Prognosis: Guarded  Problem List:  (Decreased strength; Decreased endurance; Impaired balance; Decreased mobility; Decreased cognition; Decreased safety awareness; Impaired judgement)  Assessment: Pt  seen for PT treatment session this date with interventions consisting of  therapeutic exercises, bed mobility, transfers  w/ emphasis on improving pt's mobility  Pt  Requiring max cues for sequence and safety  In comparison to previous session, Pt  With improvements in activity tolerance  Tx OOB to chair mod x 2 with use of RW  Decreased posterior lean upon standing  Pt is in need of continued activity in PT to improve strength balance endurance mobility transfers and ambulation with return to maximize LOF  From PT/mobility standpoint, recommendation at time of d/c would be post acute rehab  in order to promote return to PLOF and independence  The patient's AM-PAC Basic Mobility Inpatient Short Form Raw Score is 10  A Raw score of less than or equal to 16 suggests the patient may benefit from discharge to post-acute rehabilitation services  Please also refer to physical therapy recommendation for safe DC planning  PT Discharge Recommendation: Post acute rehabilitation services    See flowsheet documentation for full assessment

## 2022-11-03 RX ADMIN — METRONIDAZOLE 500 MG: 5 INJECTION, SOLUTION INTRAVENOUS at 10:47

## 2022-11-03 RX ADMIN — VANCOMYCIN HYDROCHLORIDE 1250 MG: 5 INJECTION, POWDER, LYOPHILIZED, FOR SOLUTION INTRAVENOUS at 20:13

## 2022-11-03 RX ADMIN — DONEPEZIL HYDROCHLORIDE 10 MG: 5 TABLET ORAL at 22:41

## 2022-11-03 RX ADMIN — SENNOSIDES AND DOCUSATE SODIUM 1 TABLET: 50; 8.6 TABLET ORAL at 22:41

## 2022-11-03 RX ADMIN — TRAZODONE HYDROCHLORIDE 50 MG: 50 TABLET ORAL at 22:41

## 2022-11-03 RX ADMIN — METRONIDAZOLE 500 MG: 5 INJECTION, SOLUTION INTRAVENOUS at 19:30

## 2022-11-03 RX ADMIN — LOSARTAN POTASSIUM 100 MG: 50 TABLET, FILM COATED ORAL at 22:41

## 2022-11-03 RX ADMIN — VANCOMYCIN HYDROCHLORIDE 1250 MG: 5 INJECTION, POWDER, LYOPHILIZED, FOR SOLUTION INTRAVENOUS at 06:16

## 2022-11-03 RX ADMIN — HEPARIN SODIUM 5000 UNITS: 5000 INJECTION INTRAVENOUS; SUBCUTANEOUS at 22:41

## 2022-11-03 RX ADMIN — PRAVASTATIN SODIUM 40 MG: 40 TABLET ORAL at 22:41

## 2022-11-03 RX ADMIN — QUETIAPINE FUMARATE 75 MG: 25 TABLET ORAL at 19:30

## 2022-11-03 RX ADMIN — QUETIAPINE FUMARATE 25 MG: 25 TABLET ORAL at 06:00

## 2022-11-03 RX ADMIN — METRONIDAZOLE 500 MG: 5 INJECTION, SOLUTION INTRAVENOUS at 03:54

## 2022-11-03 RX ADMIN — DIVALPROEX SODIUM 125 MG: 125 CAPSULE, COATED PELLETS ORAL at 22:40

## 2022-11-03 NOTE — HOSPICE NOTE
I met with family to review hospice services and they were agreeable  Patient was approved for [facility]( at home or at a SNF)  Equipment was ordered  Consents were signed  I requested a 930 am transport back to [facility] 01 [id number]  ELSY Rao and Dr Keri Martel both updated

## 2022-11-03 NOTE — CASE MANAGEMENT
Case Management Progress Note    Patient name Marybeth Lagos  Location /494-20 MRN [id number]  : 1933 Date 11/3/2022       LOS (days): 6  Geometric Mean LOS (GMLOS) (days): 4 30  Days to GMLOS:-2 2        OBJECTIVE:        Current admission status: Inpatient  Preferred Pharmacy:   [id number] [address] [place] [address]  Phone: [phone] Fax: [phone]    Primary Care Provider: Tawanda Ovalle DO    Primary Insurance: MEDICARE  Secondary Insurance: [facility]    PROGRESS NOTE:MD wanted CM to check with [facility] to see if pt can return today  [facility] from hospice did check with facility and they prefer pt return on same day hospice signs pt on to their services  MD updated on same  Request for transport submitted

## 2022-11-03 NOTE — CASE MANAGEMENT
Case Management Discharge Planning Note    Patient name Annmarie Mead  Location /139-44 MRN [id number]  : 1933 Date 11/3/2022       Current Admission Date: 10/27/2022  Current Admission Diagnosis:Acute colitis   Patient Active Problem List    Diagnosis Date Noted   • Acute colitis 10/28/2022   • Lactic acidosis 10/28/2022   • Acute delirium 10/28/2022   • Severe dementia with agitation 10/28/2022   • Acute kidney injury ([address] ) 10/28/2022   • Hypokalemia 10/28/2022   • Primary hypertension 10/28/2022      LOS (days): 6  Geometric Mean LOS (GMLOS) (days): 4 30  Days to GMLOS:-2 2     OBJECTIVE:  Risk of Unplanned Readmission Score: 17 43         Current admission status: Inpatient   Preferred Pharmacy:   [address] [place], [place] [id number], [address] [facility] [address] [facility] [address] [address]  Phone: [phone] Fax: [phone]    Primary Care Provider: Sally Marte DO    Primary Insurance: MEDICARE  Secondary Insurance: [facility]    DISCHARGE DETAILS:  [facility] at Discharge : [facility]  Dispatcher Contacted: Yes  Number/Name of Dispatcher: via round trip  Transported by [employer] and Unit #): Rachel  ETA of Transport (Date): 22  ETA of Transport (Time): 0930   call placed to [facility] and spoke with monet to give her  time tomorrow  TT to darrian Felix to update her on same  [facility] from hospice aware of  time and she emailed DNR form for MD to sign for transport

## 2022-11-03 NOTE — PROGRESS NOTES
Vancomycin IV Pharmacy-to-Dose Consultation    Marybeth Lagos is a 80 y o  male who is currently receiving Vancomycin IV with management by the Pharmacy Consult service  Assessment/Plan:  The patient was reviewed  Renal function is stable and no signs or symptoms of nephrotoxicity and/or infusion reactions were documented in the chart  Based on today’s assessment, continue current vancomycin (day # 7) dosing of 1250mg q12h, with a plan for trough to be drawn at 0600 on 11/6/22  We will continue to follow the patient’s culture results and clinical progress daily      Myke Hicks

## 2022-11-03 NOTE — ASSESSMENT & PLAN NOTE
· H&P listed sepsis as one of the primary diagnoses, SIRS criteria has not presented itself so Acute Colitis is the working primary diagnosis at this time  · CT abd/pelvis showed acute colitis as likely infectious source  · Continue ceftriaxone and Flagyl due to positive blood culture results  · ID consulted and following  · Possible ischemic colitis versus infectious colitis  · Bcxs growing G+ rods, G+ cocci in pairs & chains, likely contaminants given the multitudinous types of bacteria resulting, ID considers them most likely contaminants at this time  · Obtained 2nd set of blood cultures 10/30/22  · Negative @ 72h  · Upon discharge, change antibiotics to PO augmentin  · Acute care surgery and GI consulted, will appreciate recs  · Diet: regular  · Family & hospice discussed today 11/3, family has elected hospice care for the patient  If the hospice nurse can deliver hospice related equipment to the proper channels today (which appears unlikely at this time), then will discharge today  If not, discharge tomorrow

## 2022-11-03 NOTE — PLAN OF CARE
Problem: PAIN - ADULT  Goal: Verbalizes/displays adequate comfort level or baseline comfort level  Description: Interventions:  - Encourage patient to monitor pain and request assistance  - Assess pain using appropriate pain scale (0-10 pain scale)  - Administer analgesics based on type and severity of pain and evaluate response  - Implement non-pharmacological measures as appropriate and evaluate response  - Consider cultural and social influences on pain and pain management  - Notify physician/advanced practitioner if interventions unsuccessful or patient reports new pain  Outcome: Progressing     Problem: INFECTION - ADULT  Goal: Absence or prevention of progression during hospitalization  Description: INTERVENTIONS:  - Assess and monitor for signs and symptoms of infection  - Monitor lab/diagnostic results  - Monitor all insertion sites, i e  indwelling lines  - Administer medications as ordered  - Instruct and encourage patient and family to use good hand hygiene technique  Outcome: Progressing     Problem: SAFETY ADULT  Goal: Patient will remain free of falls  Description: INTERVENTIONS:  - Educate patient/family on patient safety including physical limitations  - Instruct patient to call for assistance with activity (min assist)  - Consult OT/PT to assist with strengthening/mobility   - Keep Call bell within reach  - Keep bed low and locked with side rails adjusted as appropriate  - Keep care items and personal belongings within reach  - Initiate and maintain comfort rounds  - Make Fall Risk Sign visible to staff (high fall risk)  - Offer Toileting every 1-2 Hours, in advance of need  - Initiate/Maintain bed/chair alarm  - Obtain necessary fall risk management equipment: nonskid footwear, call bell within reach  - Apply yellow socks and bracelet for high fall risk patients  - Consider moving patient to room near nurses station  Outcome: Progressing  Goal: Maintain or return to baseline ADL function  Description: INTERVENTIONS:  -  Assess patient's ability to carry out ADLs; (mod to max assist)  - Assess/evaluate cause of self-care deficits (confusion, fatigue, limited movement)  - Assess range of motion  - Assess patient's mobility; (min assist)  - Assess patient's need for assistive devices and provide as appropriate  - Encourage maximum independence but intervene and supervise when necessary  - Involve family in performance of ADLs  - Assess for home care needs following discharge   - Consider OT consult to assist with ADL evaluation and planning for discharge  - Provide patient education as appropriate  Outcome: Progressing  Goal: Maintains/Returns to pre admission functional level  Description: INTERVENTIONS:  - Perform BMAT or MOVE assessment daily    - Set and communicate daily mobility goal to care team and patient/family/caregiver  - Collaborate with rehabilitation services on mobility goals if consulted  - Perform Range of Motion 3-4 times a day  - Reposition patient every 1-2 hours    - Dangle patient 3-4 times a day  - Stand patient 3-4 times a day  - Ambulate patient 3-4 times a day  - Out of bed to chair 3-4 times a day   - Out of bed for meals 3-4 times a day  - Out of bed for toileting  - Record patient progress and toleration of activity level   Outcome: Progressing     Problem: DISCHARGE PLANNING  Goal: Discharge to home or other facility with appropriate resources  Description: INTERVENTIONS:  - Identify barriers to discharge w/patient and caregiver  - Arrange for needed discharge resources and transportation as appropriate  - Identify discharge learning needs (meds, wound care, etc )  - Refer to Case Management Department for coordinating discharge planning if the patient needs post-hospital services based on physician/advanced practitioner order or complex needs related to functional status, cognitive ability, or social support system  Outcome: Progressing     Problem: Knowledge Deficit  Goal: Patient/family/caregiver demonstrates understanding of disease process, treatment plan, medications, and discharge instructions  Description: Complete learning assessment and assess knowledge base    Interventions:  - Provide teaching at level of understanding  - Provide teaching via preferred learning methods  Outcome: Progressing     Problem: SAFETY,RESTRAINT: NV/NON-SELF DESTRUCTIVE BEHAVIOR  Goal: Remains free of harm/injury (restraint for non violent/non self-detsructive behavior)  Description: INTERVENTIONS:  - Instruct patient/family regarding restraint use   - Assess and monitor physiologic and psychological status   - Provide interventions and comfort measures to meet assessed patient needs   - Identify and implement measures to help patient regain control  - Assess readiness for release of restraint   Outcome: Progressing  Goal: Returns to optimal restraint-free functioning  Description: INTERVENTIONS:  - Assess the patient's behavior and symptoms that indicate continued need for restraint  - Identify and implement measures to help patient regain control  - Assess readiness for release of restraint   Outcome: Progressing     Problem: Potential for Falls  Goal: Patient will remain free of falls  Description: INTERVENTIONS:  - Educate patient/family on patient safety including physical limitations  - Instruct patient to call for assistance with activity (min assist)  - Consult OT/PT to assist with strengthening/mobility   - Keep Call bell within reach  - Keep bed low and locked with side rails adjusted as appropriate  - Keep care items and personal belongings within reach  - Initiate and maintain comfort rounds  - Make Fall Risk Sign visible to staff (high fall risk)  - Offer Toileting every 1-2 Hours, in advance of need  - Initiate/Maintain bed/chair alarm  - Obtain necessary fall risk management equipment: nonskid footwear, call bell within reach  - Apply yellow socks and bracelet for high fall risk patients  - Consider moving patient to room near nurses station  Outcome: Progressing     Problem: Prexisting or High Potential for Compromised Skin Integrity  Goal: Skin integrity is maintained or improved  Description: INTERVENTIONS:  - Identify patients at risk for skin breakdown  - Assess and monitor skin integrity  - Assess and monitor nutrition and hydration status  - Monitor labs   - Assess for incontinence (every 1-2 hours and prn)  - Turn and reposition patient (every 2 hours and prn)  - Assist with mobility/ambulation (min assist)  - Relieve pressure over bony prominences  - Avoid friction and shearing  - Provide appropriate hygiene as needed including keeping skin clean and dry  - Evaluate need for skin moisturizer/barrier cream  - Collaborate with interdisciplinary team   - Patient/family teaching  Outcome: Progressing     Problem: MOBILITY - ADULT  Goal: Maintain or return to baseline ADL function  Description: INTERVENTIONS:  -  Assess patient's ability to carry out ADLs; (mod to max assist)  - Assess/evaluate cause of self-care deficits (confusion, fatigue, limited movement)  - Assess range of motion  - Assess patient's mobility; (min assist)  - Assess patient's need for assistive devices and provide as appropriate  - Encourage maximum independence but intervene and supervise when necessary  - Involve family in performance of ADLs  - Assess for home care needs following discharge   - Consider OT consult to assist with ADL evaluation and planning for discharge  - Provide patient education as appropriate  Outcome: Progressing  Goal: Maintains/Returns to pre admission functional level  Description: INTERVENTIONS:  - Perform BMAT or MOVE assessment daily    - Set and communicate daily mobility goal to care team and patient/family/caregiver  - Collaborate with rehabilitation services on mobility goals if consulted  - Perform Range of Motion 3-4 times a day    - Reposition patient every 1-2 hours   - Dangle patient 3-4 times a day  - Stand patient 3-4 times a day  - Ambulate patient 3-4 times a day  - Out of bed to chair 3-4 times a day   - Out of bed for meals 3-4 times a day  - Out of bed for toileting  - Record patient progress and toleration of activity level   Outcome: Progressing     Problem: GASTROINTESTINAL - ADULT  Goal: Maintains or returns to baseline bowel function  Description: INTERVENTIONS:  - Assess bowel function  - Encourage oral fluids to ensure adequate hydration  - Administer IV fluids if ordered to ensure adequate hydration  - Administer ordered medications as needed  - Encourage mobilization and activity  - Consider nutritional services referral to assist patient with adequate nutrition and appropriate food choices  Outcome: Progressing  Goal: Maintains adequate nutritional intake  Description: INTERVENTIONS:  - Monitor percentage of each meal consumed  - Identify factors contributing to decreased intake, treat as appropriate  - Assist with meals as needed  - Monitor I&O, weight, and lab values if indicated  - Obtain nutrition services referral as needed  Outcome: Progressing

## 2022-11-03 NOTE — PROGRESS NOTES
[facility] [address] [place]  Progress Note - Ashish Talbot 11/7/1933, 80 y o  male MRN: [id number]  Unit/Bed#: 964-04 Encounter: [id number]  Primary Care Provider: Edwige Lopez DO   Date and time admitted to hospital: 10/27/2022 10:38 PM    * Acute colitis  Assessment & Plan  · H&P listed sepsis as one of the primary diagnoses, SIRS criteria has not presented itself so Acute Colitis is the working primary diagnosis at this time  · CT abd/pelvis showed acute colitis as likely infectious source  · Continue ceftriaxone and Flagyl due to positive blood culture results  · ID consulted and following  · Possible ischemic colitis versus infectious colitis  · Bcxs growing G+ rods, G+ cocci in pairs & chains, likely contaminants given the multitudinous types of bacteria resulting, ID considers them most likely contaminants at this time  · Obtained 2nd set of blood cultures 10/30/22  · Negative @ 72h  · Upon discharge, change antibiotics to PO augmentin  · Acute care surgery and GI consulted, will appreciate recs  · Diet: regular  · Family & hospice discussed today 11/3, family has elected hospice care for the patient  If the hospice nurse can deliver hospice related equipment to the proper channels today (which appears unlikely at this time), then will discharge today  If not, discharge tomorrow  Acute delirium  Assessment & Plan  Acute metabolic encephalopathy, present on admission, as evidenced by increased confusion per the nursing facility, transient hypotension, patient does have underlying dementia   Confusion has been minimal for the past few days during the day  · Haloperidol prn for aggression  · Virtual observer in place  · Per family discussion, will increase night time seroquel from 50 mg to 75 mg and move up timing to match when his sundownings occurs    Severe dementia with agitation  Assessment & Plan  · Continue aricept and namenda  · Reorient as needed  · Maintain good sleep-wake cycle  · See acute delirium A&P    Acute kidney injury [facility])  Assessment & Plan  Acute kidney injury was present on admission, resolved      Primary hypertension  Assessment & Plan  · Norvasc & losartan (replacement for off-formulary irbesartan during hospital stay)  · Restart irbesartan on discharge    Hypokalemia  Assessment & Plan  11/3 resolved    Lactic acidosis  Assessment & Plan  · Lactate 6 1 on admit, most recent lactate 2 0 s/p 2L fluid bolus resuscitation  · Likely 2/2 hypovolemia, resolved    VTE Pharmacologic Prophylaxis:   Pharmacologic: Heparin  Mechanical VTE Prophylaxis in Place: Yes    Patient Centered Rounds: I have evaluated the patient at bedside today    Discussions with Specialists or Other Care Team Provider: Care management, recommendations appreciated    Education and Discussions with Family / Patient: Yes    Time Spent for Care: 30 minutes  More than 50% of total time spent on counseling and coordination of care as described above  Current Length of Stay: 6 day(s)    Current Patient Status: Inpatient   Certification Statement: The patient will continue to require additional inpatient hospital stay due to awaiting hospice preparations    Discharge Plan: today or tomorrow    Code Status: Level 3 - DNAR and DNI      Subjective:   Metronidazole day 7, vancomycin day 6, yesterday family requested discussion with hospice, today it was done and family elected hospice  No acute changes overnight  Today the patient appears unchanged  He is sleeping and in no distress  Objective:     Vitals:   Temp (24hrs), Av 2 °F (36 8 °C), Min:97 7 °F (36 5 °C), Max:98 7 °F (37 1 °C)    Temp:  [97 7 °F (36 5 °C)-98 7 °F (37 1 °C)] 98 7 °F (37 1 °C)  HR:  [72-95] 73  Resp:  [16-18] 18  BP: (131-169)/() 169/107  SpO2:  [95 %-96 %] 95 %  Body mass index is 23 31 kg/m²  Input and Output Summary (last 24 hours):        Intake/Output Summary (Last 24 hours) at 11/3/2022 1136  Last data filed at 2022 2120  Gross per 24 hour   Intake 360 ml   Output 100 ml   Net 260 ml       Physical Exam:     Physical Exam  Vitals and nursing note reviewed  Constitutional:       General: He is not in acute distress  Appearance: Normal appearance  He is well-developed  He is not ill-appearing  HENT:      Head: Normocephalic and atraumatic  Eyes:      General: No scleral icterus  Right eye: No discharge  Left eye: No discharge  Conjunctiva/sclera: Conjunctivae normal    Cardiovascular:      Rate and Rhythm: Normal rate and regular rhythm  Pulses: Normal pulses  Heart sounds: Normal heart sounds  No murmur heard  No friction rub  No gallop  Pulmonary:      Effort: Pulmonary effort is normal  No respiratory distress  Breath sounds: Normal breath sounds  No wheezing, rhonchi or rales  Abdominal:      General: Abdomen is flat  Bowel sounds are normal  There is no distension  Palpations: Abdomen is soft  There is no mass  Tenderness: There is no abdominal tenderness  There is no guarding or rebound  Musculoskeletal:         General: No swelling  Cervical back: Neck supple  Skin:     General: Skin is warm and dry  Neurological:      Mental Status: He is alert     Psychiatric:      Comments: Patient sleeping during exam         Additional Data:     Labs:    Results from last 7 days   Lab Units 10/31/22  0627   WBC Thousand/uL 6 94   HEMOGLOBIN g/dL 13 7   HEMATOCRIT % 40 7   PLATELETS Thousands/uL 163   NEUTROS PCT % 63   LYMPHS PCT % 20   MONOS PCT % 13*   EOS PCT % 3     Results from last 7 days   Lab Units 11/02/22  0448 10/31/22  0627 10/30/22  0457   SODIUM mmol/L 142   < > 142   POTASSIUM mmol/L 3 6   < > 3 5   CHLORIDE mmol/L 108   < > 104   CO2 mmol/L 24   < > 30   BUN mg/dL 14   < > 7   CREATININE mg/dL 1 04   < > 1 02   ANION GAP mmol/L 10   < > 8   CALCIUM mg/dL 8 7   < > 8 9   ALBUMIN g/dL  --   --  3 1*   TOTAL BILIRUBIN mg/dL  --   --  0 59   ALK PHOS U/L  --   --  64   ALT U/L  --   --  13   AST U/L  --   --  25   GLUCOSE RANDOM mg/dL 101   < > 88    < > = values in this interval not displayed  Results from last 7 days   Lab Units 10/30/22  0457   INR  1 18             Results from last 7 days   Lab Units 10/30/22  0457 10/28/22  0524 10/28/22  0142   LACTIC ACID mmol/L  --  2 0 6 1*   PROCALCITONIN ng/ml <0 05  --   --            * I Have Reviewed All Lab Data Listed Above  * Additional Pertinent Lab Tests Reviewed: All Labs Within Last 24 Hours Reviewed    Imaging:    Imaging Reports Reviewed Today Include: no new  Imaging Personally Reviewed by Myself Includes:  N/A    Recent Cultures (last 7 days):     Results from last 7 days   Lab Units 10/30/22  1022 10/28/22  0138   BLOOD CULTURE  No Growth at 72 hrs  No Growth at 72 hrs   Staphylococcus pettenkoferi*  Staphylococcus*  Corynebacterium glucuronolyticum*  Bacillus thuringiensis*  Staphylococcus pettenkoferi*   GRAM STAIN RESULT   --  Gram positive rods*  Gram positive cocci in pairs and chains*  Gram positive cocci in pairs*       Last 24 Hours Medication List:   Current Facility-Administered Medications   Medication Dose Route Frequency Provider Last Rate   • acetaminophen  650 mg Oral Q6H PRN Lynette Mcguire MD     • amLODIPine  2 5 mg Oral Daily Minoo Chavez MD     • bisacodyl  10 mg Rectal Daily Rocco Vazquez PA-C     • divalproex sodium  125 mg Oral Q12H [address] Minoo Chavez MD     • donepezil  10 mg Oral HS Lynette Mcguire MD     • haloperidol lactate  2 mg Intramuscular Q6H PRN Brian Plata, DO     • heparin (porcine)  5,000 Units Subcutaneous Q12H [address] Lynette Mcguire MD     • iohexol  100 mL Intravenous Once in imaging Lynette Mcguire MD     • labetalol  10 mg Intravenous Q6H PRN Brian Plata DO     • losartan  100 mg Oral Daily Minoo Chavez MD     • memantine  5 mg Oral Daily Lynette Mcguire MD     • metroNIDAZOLE  500 mg Intravenous [facility] Juana Theodore MD Pablo 500 mg (11/03/22 1047)   • ondansetron  4 mg Intravenous Q6H PRN Alejandro Bagley MD     • pantoprazole  40 mg Oral Daily Margaretann Boast, MD     • pravastatin  40 mg Oral HS Alejandro Bagley MD     • QUEtiapine  25 mg Oral Early Morning Alejandro Bagley MD     • QUEtiapine  75 mg Oral HS Edelmira Griffin PA-C     • senna-docusate sodium  1 tablet Oral HS Shea Ramirez MD     • traZODone  50 mg Oral HS Alejandro Bagley MD     • vancomycin  1,250 mg Intravenous Q12H Fabio Evans MD 1,250 mg (11/03/22 1697)        Today, Patient Was Seen By: Margaretann Boast    ** Please Note: Dictation voice to text software may have been used in the creation of this document   **

## 2022-11-04 ENCOUNTER — HOME CARE VISIT (OUTPATIENT)
Dept: HOME HOSPICE | Facility: HOSPICE | Age: 87
End: 2022-11-04

## 2022-11-04 ENCOUNTER — HOME CARE VISIT (OUTPATIENT)
Dept: HOME HEALTH SERVICES | Facility: HOME HEALTHCARE | Age: 87
End: 2022-11-04

## 2022-11-04 VITALS
TEMPERATURE: 97.8 F | RESPIRATION RATE: 18 BRPM | HEART RATE: 54 BPM | SYSTOLIC BLOOD PRESSURE: 124 MMHG | DIASTOLIC BLOOD PRESSURE: 86 MMHG

## 2022-11-04 VITALS
SYSTOLIC BLOOD PRESSURE: 167 MMHG | WEIGHT: 162.48 LBS | HEART RATE: 70 BPM | HEIGHT: 70 IN | TEMPERATURE: 96.8 F | BODY MASS INDEX: 23.26 KG/M2 | DIASTOLIC BLOOD PRESSURE: 98 MMHG | OXYGEN SATURATION: 94 % | RESPIRATION RATE: 18 BRPM

## 2022-11-04 DIAGNOSIS — Z51.5 HOSPICE CARE: Primary | ICD-10-CM

## 2022-11-04 DIAGNOSIS — F03.C11 SEVERE DEMENTIA WITH AGITATION, UNSPECIFIED DEMENTIA TYPE: ICD-10-CM

## 2022-11-04 PROBLEM — N17.9 ACUTE KIDNEY INJURY (HCC): Status: RESOLVED | Noted: 2022-10-28 | Resolved: 2022-11-04

## 2022-11-04 PROBLEM — K52.9 ACUTE COLITIS: Status: RESOLVED | Noted: 2022-10-28 | Resolved: 2022-11-04

## 2022-11-04 PROBLEM — E87.20 LACTIC ACIDOSIS: Status: RESOLVED | Noted: 2022-01-01 | Resolved: 2022-01-01

## 2022-11-04 PROBLEM — E87.6 HYPOKALEMIA: Status: RESOLVED | Noted: 2022-10-28 | Resolved: 2022-11-04

## 2022-11-04 PROBLEM — R41.0 ACUTE DELIRIUM: Status: RESOLVED | Noted: 2022-10-28 | Resolved: 2022-11-04

## 2022-11-04 LAB
BACTERIA BLD CULT: NORMAL
BACTERIA BLD CULT: NORMAL

## 2022-11-04 RX ORDER — LORAZEPAM 0.5 MG/1
0.5 TABLET ORAL EVERY 6 HOURS PRN
Qty: 10 TABLET | Refills: 0 | Status: SHIPPED | OUTPATIENT
Start: 2022-11-04

## 2022-11-04 RX ORDER — MORPHINE SULFATE 100 MG/5ML
5 SOLUTION, CONCENTRATE ORAL
Qty: 30 ML | Refills: 0 | Status: SHIPPED | OUTPATIENT
Start: 2022-11-04

## 2022-11-04 RX ORDER — METHYLPREDNISOLONE 4 MG/1
TABLET ORAL
Qty: 21 TABLET | Refills: 0 | Status: SHIPPED | OUTPATIENT
Start: 2022-11-04

## 2022-11-04 RX ORDER — NYSTATIN 100000 [USP'U]/G
POWDER TOPICAL 3 TIMES DAILY
Qty: 15 G | Refills: 0 | Status: SHIPPED | OUTPATIENT
Start: 2022-11-04

## 2022-11-04 RX ORDER — AMOXICILLIN AND CLAVULANATE POTASSIUM 875; 125 MG/1; MG/1
1 TABLET, FILM COATED ORAL EVERY 12 HOURS SCHEDULED
Qty: 14 TABLET | Refills: 0 | Status: CANCELLED | OUTPATIENT
Start: 2022-11-04 | End: 2022-11-11

## 2022-11-04 RX ADMIN — MEMANTINE 5 MG: 5 TABLET ORAL at 08:57

## 2022-11-04 RX ADMIN — METRONIDAZOLE 500 MG: 5 INJECTION, SOLUTION INTRAVENOUS at 03:14

## 2022-11-04 RX ADMIN — PANTOPRAZOLE SODIUM 40 MG: 40 TABLET, DELAYED RELEASE ORAL at 08:57

## 2022-11-04 RX ADMIN — BISACODYL 10 MG: 10 SUPPOSITORY RECTAL at 08:57

## 2022-11-04 RX ADMIN — AMLODIPINE BESYLATE 2.5 MG: 2.5 TABLET ORAL at 08:57

## 2022-11-04 RX ADMIN — DIVALPROEX SODIUM 125 MG: 125 CAPSULE, COATED PELLETS ORAL at 08:57

## 2022-11-04 RX ADMIN — VANCOMYCIN HYDROCHLORIDE 1250 MG: 5 INJECTION, POWDER, LYOPHILIZED, FOR SOLUTION INTRAVENOUS at 06:06

## 2022-11-04 RX ADMIN — QUETIAPINE FUMARATE 25 MG: 25 TABLET ORAL at 06:06

## 2022-11-04 RX ADMIN — HEPARIN SODIUM 5000 UNITS: 5000 INJECTION INTRAVENOUS; SUBCUTANEOUS at 08:57

## 2022-11-04 NOTE — CASE MANAGEMENT
Case Management Discharge Planning Note    Patient name Ginger Grayson  Location /483-33 MRN [id number]  : 1933 Date 2022       Current Admission Date: 10/27/2022  Current Admission Diagnosis:Severe dementia with agitation   Patient Active Problem List    Diagnosis Date Noted   • Severe dementia with agitation 10/28/2022   • Primary hypertension 10/28/2022      LOS (days): 7  Geometric Mean LOS (GMLOS) (days): 4 30  Days to GMLOS:-3     OBJECTIVE:  Risk of Unplanned Readmission Score: 15 28         Current admission status: Inpatient   Preferred Pharmacy:   [address] [place] [address] #[id number] Ema Burdick [address] [facility] [address] [address]  Phone: [phone] Fax: [phone]    Primary Care Provider: Cora Matthews DO    Primary Insurance: MEDICARE  Secondary Insurance: [address] [place] [address] [facility]  Medical necessity form, [facility] phone number for report  and DNR form given to primary nurse AJ for transport  0930  scheduled by [facility]                                                                                           IMM Given (Date):: 22  IMM Given to[de-identified] Family

## 2022-11-04 NOTE — PROGRESS NOTES
11/4/2022 1:36 PM  [facility] facility patient requests facility patient comfort meds  Filled electronically via Epic as per [place] State Law  Requested Prescriptions     Signed Prescriptions Disp Refills   • Morphine Sulfate, Concentrate, 20 mg/mL concentrated solution 30 mL 0     Sig: Take 0 25 mL (5 mg total) by mouth every 3 (three) hours as needed for severe pain (pain / SOB) Comfort Medication Max Daily Amount: 40 mg   • LORazepam (Ativan) 0 5 mg tablet 10 tablet 0     Sig: Take 1 tablet (0 5 mg total) by mouth every 6 (six) hours as needed for anxiety (restlessness / dyspnea) PO/SL comfort medication may crush       [facility] [place] Answering Service: [phone]  You can find me on [employer]!

## 2022-11-04 NOTE — DISCHARGE SUMMARY
[address] [facility] [address]  Discharge- Akila Steven 11/7/1933, 80 y o  male MRN: [id number]  Unit/Bed#: 903-51 Encounter: [id number]  Primary Care Provider: Lorrie Coleman DO   Date and time admitted to hospital: 10/27/2022 10:38 PM    * Acute colitis  Assessment & Plan  · H&P listed sepsis as one of the primary diagnoses, SIRS criteria has not presented itself so Acute Colitis is the working primary diagnosis at this time  · CT abd/pelvis showed acute colitis as likely infectious source  · Possible ischemic colitis versus infectious colitis  ? Bcxs growing G+ rods, G+ cocci in pairs & chains, likely contaminants given the multitudinous types of bacteria resulting, ID considers them most likely contaminants at this time  ? Obtained 2nd set of blood cultures 10/30/22  § Negative @ 96h  § Antibiotic regimen was completed prior to discharge  · Diet: regular  · Resolved  · GI & acute care surgery were following  · Family & hospice discussed 11/3, family has elected hospice care for the patient  On 11/4 patient will be discharged to [facility] as hospice      Acute delirium  Assessment & Plan  Acute metabolic encephalopathy, present on admission, as evidenced by increased confusion per the nursing facility, transient hypotension, patient does have underlying dementia   Confusion has been minimal for the past few days during the day  · Haloperidol prn for aggression was used infrequently  · Virtual observer was in place during entirety of inpatient stay  · Continued seroquel and modified the dosage during hospital stay to best cover sundowning periods at night     Severe dementia with agitation  Assessment & Plan  · Continue aricept and namenda   · Reorient as needed  · Maintain good sleep-wake cycle  · See acute delirium A&P     Acute kidney injury ([facility] [address] )  Assessment & Plan  Acute kidney injury was present on admission, resolved        Primary hypertension  Assessment & Plan  · Norvasc & losartan (replacement for off-formulary irbesartan during hospital stay)   · Restarted irbesartan on discharge, discontinued losartan     Hypokalemia  Assessment & Plan  11/3 resolved     Lactic acidosis  Assessment & Plan  · Lactate 6 1 on admit, most recent lactate 2 0 s/p 2L fluid bolus resuscitation  · Likely 2/2 hypovolemia, resolved    Discharging Physician / Practitioner: Clarisse Jones  PCP: Gal Buenrostro DO  Admission Date:   Admission Orders (From admission, onward)     Ordered        10/28/22 0116  INPATIENT ADMISSION  Once                      Discharge Date: 11/04/22    Medical Problems             Resolved Problems  Date Reviewed: 11/4/2022          Resolved    * (Principal) Acute colitis 11/4/2022     Resolved by  Clarisse Jones MD    Lactic acidosis 11/4/2022     Resolved by  Clarisse Jones MD    Acute delirium 11/4/2022     Resolved by  Clarisse Jones MD    Acute kidney injury [facility]) 11/4/2022     Resolved by  Clarisse Jones MD    Hypokalemia 11/4/2022     Resolved by  Clarisse Jones MD                Consultations During Hospital Stay:  · GI, general surgery, ID    Procedures Performed:   · none    Significant Findings / Test Results:   · Acute colitis found on CT abdomen/pelvis w/contrast on admission    Incidental Findings:   · Scattered hypodensities representing likely cysts on liver    Test Results Pending at Discharge (will require follow up): · None, repeat blood cultures negative >96 hours     Outpatient Tests Requested:  · none    Complications:  none    Reason for Admission: Delirium, dehydration, acute colitis    Per HPI 10/28:  "Meena Lopez is a 80 y o  male resident of [facility]  His PMH is remarkable for advanced dementia with behavioral disturbances  Patient as such is unable to provide any pertinent history   He was transported tonight from his nursing home abode to this facility's ED due to transient unresponsiveness with generalized weakness & worsened confusion, as well as BP lower than usual  Patient reportedly had an unwitnessed fall at the nursing home earlier in the day  His vitals upon ED arrival incl /94 with temp 36 2C  Noncontrast head CT was neg for acute intracranial abnormality  CT abd/pelvis with IV contrast was remarkable for infectious vs inflammatory cecum & ascending colitis( nonemergent colonoscopy recommended to exclude neoplasm ), with no evidence of large or small bowel obstruction  Due to agitation while kicking & swinging at nursing staff, patient was placed in 4 point restraints at the ED & given haldol 5mg IM dose  He was given 1L NS IV bolus as well as IV Rocephin & flagyl prior to being admitted to the hospitalist service for further management of colitis  Due to continued patient agitation with violent behavior towards nursing staff while swing both arms & kicking both legs as witnessed by myself, 4 point restraint( due to risk of patient harm to self & others )was continued upon patient arrival to Med/Surg Floor "    Hospital Course:     Patient was admitted to 4th floor med surgery unit for the above on 10/28/22  After admission his restraints were deescalated to nonviolent restraints and only as needed  He occasionally required haloperidol for aggression usually sundowning  By the end of the hospital stay these episodes were infrequent and milder  His dehydration and lactic acidosis quickly resolved after fluid boluses in the ED and application of IV maintenance fluid, which was discontinued when lactic acidosis resolved and the patient appeared to no longer appeared to be clinically hypovolemic  His blood pressure remained high and so he was progressively restarted back on his home blood pressure regimen, at 1 point required a dose of IV labetalol for hypertensive urgency  Ceftriaxone was discontinued on 10/30 in favor of vancomycin, which he continued alongside metronidazole until the day prior to admission    His blood cultures grew multiple gram positive organisms to the point where Infectious Disease was more concerned for contamination of the blood specimen than actual bacteremia  There was consideration that acute colitis was due to dehydration and decreased perfusion to the colon versus infection  Repeat blood cultures showed no growth, either due to eradication of pathogens or lack of contaminant  General surgery felt there was no need to operate on this patient  During the hospital course the family began considering hospice care for Mr Hinton due to his longstanding dementia and deteriorating clinical condition  Arrangements were made for him to undergo hospice care at [facility]  Please see above list of diagnoses and related plan for additional information  Condition at Discharge: stable     Discharge Day Visit / Exam:     Subjective: Yesterday completed metronidazole a day course and vancomycin 6 day course, recently discussed plan of care with family who elected for hospice care at [facility] assisted living facility, it took until today to make the necessary preparations for discharge and he will be going home today  Overall the patient is well, no new or worsening complaints today  Appears to be at his baseline dementia without signs of delirium  Vitals: Blood Pressure: 132/75 (11/03/22 1631)  Pulse: 65 (11/03/22 1631)  Temperature: 98 1 °F (36 7 °C) (11/03/22 1631)  Temp Source: Axillary (11/03/22 3482)  Respirations: 15 (11/03/22 1631)  Height: 5' 10" (177 8 cm) (10/28/22 5116)  Weight - Scale: 73 7 kg (162 lb 7 7 oz) (11/03/22 0600)  SpO2: 96 % (11/03/22 1631)  Exam:   Physical Exam  Vitals and nursing note reviewed  Constitutional:       Appearance: Normal appearance  He is well-developed  HENT:      Head: Normocephalic and atraumatic  Eyes:      General: No scleral icterus  Right eye: No discharge  Left eye: No discharge        Conjunctiva/sclera: Conjunctivae normal    Cardiovascular:      Rate and Rhythm: Normal rate and regular rhythm  Pulses: Normal pulses  Heart sounds: Normal heart sounds  No murmur heard  No friction rub  No gallop  Pulmonary:      Effort: Pulmonary effort is normal  No respiratory distress  Breath sounds: Normal breath sounds  No wheezing, rhonchi or rales  Abdominal:      General: Abdomen is flat  Bowel sounds are normal  There is no distension  Palpations: Abdomen is soft  There is no mass  Tenderness: There is no abdominal tenderness  There is no guarding or rebound  Musculoskeletal:         General: No swelling  Cervical back: Neck supple  Skin:     General: Skin is warm and dry  Neurological:      Mental Status: He is alert  Psychiatric:         Mood and Affect: Mood normal       Comments: Baseline dementia without signs of delirium         Discussion with Family: yes    Discharge instructions/Information to patient and family:   See after visit summary for information provided to patient and family  Provisions for Follow-Up Care:  See after visit summary for information related to follow-up care and any pertinent home health orders  Disposition:     Assisted Living Facility at [facility]      Planned Readmission: no     Discharge Statement:  I spent 30 minutes discharging the patient  This time was spent on the day of discharge  I had direct contact with the patient on the day of discharge  Greater than 50% of the total time was spent examining patient, answering all patient questions, arranging and discussing plan of care with patient as well as directly providing post-discharge instructions  Additional time then spent on discharge activities  Discharge Medications:  See after visit summary for reconciled discharge medications provided to patient and family        ** Please Note: This note has been constructed using a voice recognition system **

## 2022-11-05 ENCOUNTER — HOME CARE VISIT (OUTPATIENT)
Dept: HOME HEALTH SERVICES | Facility: HOME HEALTHCARE | Age: 87
End: 2022-11-05

## 2022-11-05 VITALS
TEMPERATURE: 98.4 F | RESPIRATION RATE: 18 BRPM | SYSTOLIC BLOOD PRESSURE: 124 MMHG | OXYGEN SATURATION: 97 % | DIASTOLIC BLOOD PRESSURE: 70 MMHG | HEART RATE: 92 BPM

## 2022-11-05 NOTE — PROGRESS NOTES
11/4/2022 11:50 PM  [facility]ly [facility] facility patient requests facility patient medication  Filled electronically via Epic as per [place] State Law  Requested Prescriptions     Signed Prescriptions Disp Refills   • methylPREDNISolone 4 MG tablet therapy pack 21 tablet 0     Sig: Use as directed on package   • nystatin (MYCOSTATIN) powder 15 g 0     Sig: Apply topically 3 (three) times a day Apply small amount to groin and smooth powder forms thin even coat   • zinc oxide (BALMEX) 11 3 % cream 56 g 0     Sig: Apply topically 3 (three) times a day as needed (skin irritation) Apply thin coat over nystatin powder to groin - should almost be able to see through       [address] [place] [address] Answering Service: [phone]  You can find me on [place]!

## 2022-11-06 VITALS — TEMPERATURE: 97.2 F | RESPIRATION RATE: 16 BRPM | HEART RATE: 80 BPM

## 2022-11-07 ENCOUNTER — HOME CARE VISIT (OUTPATIENT)
Dept: HOME HOSPICE | Facility: HOSPICE | Age: 87
End: 2022-11-07

## 2022-11-07 ENCOUNTER — HOME CARE VISIT (OUTPATIENT)
Dept: HOME HEALTH SERVICES | Facility: HOME HEALTHCARE | Age: 87
End: 2022-11-07

## 2022-11-07 VITALS
HEART RATE: 50 BPM | DIASTOLIC BLOOD PRESSURE: 83 MMHG | SYSTOLIC BLOOD PRESSURE: 141 MMHG | RESPIRATION RATE: 18 BRPM | TEMPERATURE: 98 F

## 2022-11-08 ENCOUNTER — HOME CARE VISIT (OUTPATIENT)
Dept: HOME HEALTH SERVICES | Facility: HOME HEALTHCARE | Age: 87
End: 2022-11-08

## 2022-11-08 ENCOUNTER — HOME CARE VISIT (OUTPATIENT)
Dept: HOME HOSPICE | Facility: HOSPICE | Age: 87
End: 2022-11-08

## 2022-11-09 ENCOUNTER — HOME CARE VISIT (OUTPATIENT)
Dept: HOME HEALTH SERVICES | Facility: HOME HEALTHCARE | Age: 87
End: 2022-11-09

## 2022-11-11 ENCOUNTER — HOME CARE VISIT (OUTPATIENT)
Dept: HOME HEALTH SERVICES | Facility: HOME HEALTHCARE | Age: 87
End: 2022-11-11

## 2022-11-14 ENCOUNTER — HOME CARE VISIT (OUTPATIENT)
Dept: HOME HEALTH SERVICES | Facility: HOME HEALTHCARE | Age: 87
End: 2022-11-14

## 2022-11-15 ENCOUNTER — HOME CARE VISIT (OUTPATIENT)
Dept: HOME HEALTH SERVICES | Facility: HOME HEALTHCARE | Age: 87
End: 2022-11-15

## 2022-11-15 ENCOUNTER — HOME CARE VISIT (OUTPATIENT)
Dept: HOME HOSPICE | Facility: HOSPICE | Age: 87
End: 2022-11-15

## 2022-11-15 VITALS
RESPIRATION RATE: 16 BRPM | TEMPERATURE: 97.5 F | DIASTOLIC BLOOD PRESSURE: 70 MMHG | SYSTOLIC BLOOD PRESSURE: 124 MMHG | HEART RATE: 78 BPM

## 2022-11-18 ENCOUNTER — HOME CARE VISIT (OUTPATIENT)
Dept: HOME HEALTH SERVICES | Facility: HOME HEALTHCARE | Age: 87
End: 2022-11-18

## 2022-11-20 VITALS — RESPIRATION RATE: 16 BRPM | HEART RATE: 68 BPM | TEMPERATURE: 97.5 F

## 2022-11-21 ENCOUNTER — HOME CARE VISIT (OUTPATIENT)
Dept: HOME HEALTH SERVICES | Facility: HOME HEALTHCARE | Age: 87
End: 2022-11-21

## 2022-11-22 ENCOUNTER — HOME CARE VISIT (OUTPATIENT)
Dept: HOME HEALTH SERVICES | Facility: HOME HEALTHCARE | Age: 87
End: 2022-11-22

## 2022-11-23 ENCOUNTER — HOME CARE VISIT (OUTPATIENT)
Dept: HOME HEALTH SERVICES | Facility: HOME HEALTHCARE | Age: 87
End: 2022-11-23

## 2022-11-23 VITALS
RESPIRATION RATE: 18 BRPM | TEMPERATURE: 97.8 F | SYSTOLIC BLOOD PRESSURE: 137 MMHG | DIASTOLIC BLOOD PRESSURE: 78 MMHG | HEART RATE: 60 BPM

## 2022-11-28 ENCOUNTER — HOME CARE VISIT (OUTPATIENT)
Dept: HOME HEALTH SERVICES | Facility: HOME HEALTHCARE | Age: 87
End: 2022-11-28

## 2022-11-29 ENCOUNTER — HOME CARE VISIT (OUTPATIENT)
Dept: HOME HEALTH SERVICES | Facility: HOME HEALTHCARE | Age: 87
End: 2022-11-29

## 2022-11-30 ENCOUNTER — HOME CARE VISIT (OUTPATIENT)
Dept: HOME HEALTH SERVICES | Facility: HOME HEALTHCARE | Age: 87
End: 2022-11-30

## 2022-12-01 ENCOUNTER — HOME CARE VISIT (OUTPATIENT)
Dept: HOME HEALTH SERVICES | Facility: HOME HEALTHCARE | Age: 87
End: 2022-12-01

## 2022-12-02 ENCOUNTER — HOME CARE VISIT (OUTPATIENT)
Dept: HOME HEALTH SERVICES | Facility: HOME HEALTHCARE | Age: 87
End: 2022-12-02

## 2022-12-05 VITALS
SYSTOLIC BLOOD PRESSURE: 124 MMHG | TEMPERATURE: 98 F | HEART RATE: 76 BPM | RESPIRATION RATE: 20 BRPM | DIASTOLIC BLOOD PRESSURE: 70 MMHG

## 2022-12-06 ENCOUNTER — HOME CARE VISIT (OUTPATIENT)
Dept: HOME HOSPICE | Facility: HOSPICE | Age: 87
End: 2022-12-06

## 2022-12-06 ENCOUNTER — HOME CARE VISIT (OUTPATIENT)
Dept: HOME HEALTH SERVICES | Facility: HOME HEALTHCARE | Age: 87
End: 2022-12-06

## 2022-12-06 ENCOUNTER — HOSPITAL ENCOUNTER (EMERGENCY)
Facility: HOSPITAL | Age: 87
Discharge: NURSING, REHAB OR LONG-TERM CARE | End: 2022-12-06
Attending: EMERGENCY MEDICINE

## 2022-12-06 VITALS
BODY MASS INDEX: 22.9 KG/M2 | DIASTOLIC BLOOD PRESSURE: 95 MMHG | OXYGEN SATURATION: 99 % | WEIGHT: 160 LBS | HEART RATE: 74 BPM | TEMPERATURE: 97.4 F | SYSTOLIC BLOOD PRESSURE: 161 MMHG | RESPIRATION RATE: 18 BRPM | HEIGHT: 70 IN

## 2022-12-06 DIAGNOSIS — F03.C11 SEVERE DEMENTIA WITH AGITATION, UNSPECIFIED DEMENTIA TYPE: Primary | ICD-10-CM

## 2022-12-06 PROBLEM — F02.818 ALZHEIMER'S DEMENTIA WITH BEHAVIORAL DISTURBANCE (HCC): Status: ACTIVE | Noted: 2022-12-06

## 2022-12-06 PROBLEM — G30.9 ALZHEIMER'S DEMENTIA WITH BEHAVIORAL DISTURBANCE (HCC): Status: ACTIVE | Noted: 2022-12-06

## 2022-12-06 RX ORDER — DIVALPROEX SODIUM 125 MG/1
250 CAPSULE, COATED PELLETS ORAL ONCE
Status: COMPLETED | OUTPATIENT
Start: 2022-12-06 | End: 2022-12-06

## 2022-12-06 RX ADMIN — DIVALPROEX SODIUM 250 MG: 125 CAPSULE, COATED PELLETS ORAL at 15:30
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
## 2022-12-06 NOTE — DISCHARGE INSTRUCTIONS
In Discussion with Hospice Dr Deepthi Blanco, patient should increase depakote dosing to 250mg twice daily for a total daily dose of 500mg  If you develop any new or worsening symptoms please immediately return to your nearest emergency department

## 2022-12-06 NOTE — CASE MANAGEMENT
Case Management Progress Note    Patient name Beth Hubbard  Location [address]  MRN [id number]  : 1933 Date 2022       LOS (days): 0  Geometric Mean LOS (GMLOS) (days):   Days to GMLOS:        OBJECTIVE:        Current admission status: Emergency  Preferred Pharmacy:   [facility] [address] [place] [address] [address]   [facility] AFFILIATED WITH [place] [address]  Phone: [phone] Fax: [phone]    Primary Care Provider: Kylee Jiménez DO    Primary Insurance: MEDICARE  Secondary Insurance: [employer] [facility]    PROGRESS NOTE:    Consult  from Dr Em Lambert and discussed case  Patient sent to ED from [address] unit due to behaviors  Patient at facility on hospice care  Hospice agency is [facility]  Patient seen by psych on consult and cleared  Per Dr Em Lambert patient cleared for discharge to facility and continue with hospice care  Updated  Jude Osman [place] hospice nurse that patient will return to facility today  Spoke with Elmer Blanco at [facility] and provided with update  AVS faxed to [facility] at [phone]  Transport scheduled for     Update granddaughter  Tika Riggins MD and Nurse Jenni Cushing

## 2022-12-06 NOTE — CONSULTS
TeleConsultation - [address] [facility] 80 y o  male MRN: [id number]  Unit/Bed#: Jose Hernandez 01 Encounter: [id number]        REQUIRED DOCUMENTATION:     1  This service was provided via Telemedicine  2  Provider [facility] [address]  3  TeleMed provider: Tawana Guzman DO   4  Identify all parties in room with patient during tele consult:  Dennis Davidson   5  Patient was then informed that this was a Telemedicine visit and that the exam was being conducted confidentially over secure lines  My office door was closed  The patient was notified the following individuals were present in the room Dr Tawana Guzman, Dr Juanita Romano  Patient lacked capacity to give consent or understanding of privacy and security of the Telemedicine visit, or give permission to have the assistant stay in the room in order to assist with the history and to conduct the exam  I informed the patient that I have reviewed their record in Epic and presented the opportunity for them to ask any questions regarding the visit today  The patient lacked capacity to consent to visit  Assessment/Plan     Active Problems:    Alzheimer's dementia with behavioral disturbance [facility])    Assessment:    Patient is a 81 yo male on hospice with a past psychiatric history of Dementia who was brought to the [address] [facility] ED after punching a staff member at his nursing home  He was referred for a psychiatric consult for agitation by request of the nursing home  Majority of history is obtained from the chart review and collateral information as patient lacks capacity to provide accurate history and is unable to respond to questioning  He appears confused and disoriented to surroundings and situation  Per nursing home, patient has a history of sundowning with violence and aggression  Per collateral, Chi Hyde, he has poor memory and recall and has had episodes of violence only in the past 6 months   Per nursing home, he has a history of sundowning with violence and aggression  Per chart review, he recently presented to the ED on 10/25/22 due to agitation from delirium in the setting of acute colitis  His dementia returned to baseline following treatment of colitis  Per chart, current psychiatric medications include: Seroquel 25 mg qam and 50 mg qhs, Depakote 125 mg BID, Trazodone 50 mg qhs, Namenda 5 mg daily, and Ativan 0 5 mg q6h prn  Patient's symptoms are consistent with Alzheimer's Dementia with behavioral changes  Treatment Plan:    1) Medical workup for behavioral changes in dementia:  - Recommend labs to rule-out organic causes of agitation in dementia  - Lab workup for delirium: CBC with diff, CMP, TSH, UA, vitamin B12 and folate; if abnormal, follow-up with further studies   - Recommend CT head to rule-out stroke, intracranial hematoma if recent head trauma, tumors, etc ; if abnormal, follow-up with further imaging  2) Monitor:  If agitation is repeated in the ED setting within 24 hours of admission, patient will need transfer to inpatient psychiatry to continue management of his agitation and medication changes  If patient does not present with agitation in the ED and if medical workup is negative, patient can return to nursing home and follow with an outpatient psychiatrist for medication changes  3) Medication changes:  Recommend tapering off of Seroquel due to the increased safety risks and adverse effects of antipsychotics in elderly dementia patients  Avoid benzodiazepines due to adverse effects, increased risk of falls, and impaired mobility and cognition in elderly  Recommend starting an antidepressant such as Celexa, Lexapro, Remeron, Zoloft or an anxiolytic such as Buspar to more safely address agitation with less side effects or adverse effects      4) Non-pharmacological modifications:  • Monitor mental status, provide redirection, reorientation, and distraction techniques as needed  • Encourage regular sleep-wake cycles, regular voiding  • Avoid medications which can predispose delirium such as tramadol, benzodiazepines, anticholinergics, benadryl, opioids  Maintain fall and safety precautions   Assist with ADLs/IADLs  Treat pain using geriatric pain protocol   Encourage oral hydration and nutrition   Monitor for constipation and urinary retention   Implement sleep hygiene and limit night time interuptions   Encourage early and frequent mobilization   Encourage participation in group activities      Current Medications:     Current Facility-Administered Medications   Medication Dose Route Frequency Provider Last Rate   • divalproex sodium 250 mg Oral Once Peder Shallow A DO Ryan      Seroquel  25 mg qam 50 mg qhs Oral       Depakote 125 mg Oral BID      Trazodone 50 mg Oral QHS      Namenda 5 mg  daily      Ativan 0 5 mg Oral Q6H prn         Risks / Benefits of Treatment:    Patient does not verbalize understanding as he lacks capacity and is unable to cooperate in interview  Inpatient consult to Psychiatry  Consult performed by: Elvin Fuentes DO  Consult ordered by: Shaun Bagley DO        Physician Requesting Consult: Shaun Bagley DO  Principal Problem:<principal problem not specified>    Reason for Consult: acute agitation      History of Present Illness      Patient is a 80 y o  male on hospice with a history of Dementia who was admitted to the [address] [place] [address] ED on 12/6/2022, after punching a staff member at his nursing home, [facility]  He is accompanied by his granddaughter who is his POA  Patient was referred for a psychiatric consult for agitation by request of the nursing home, which stated that without a psych evaluation, he will not be allowed to return  Patient is briefly seen by virtual telepsychiatry consult  Majority of history is obtained from the chart review and collateral information as patient lacks capacity and is unable to respond to questioning during encounter   On consultation, he appears to be confused and disoriented to his surroundings and situation  Per collateral from partner, Blaine San, patient has only had episodes of violence since joining the nursing home 6 months ago and he has told her he dislikes the nursing home  She states that being asked to shower is a trigger for his aggression  She states his dementia symptoms began ~5 years ago and have significantly worsened recently  Per nursing home, he has a history of sundowning with violence and aggression  Per chart review, he recently presented to the ED on 10/25/22 due to agitation from delirium in the setting of acute colitis  His dementia returned to baseline following treatment of colitis  Per chart, current psychiatric medications patient is taking include: Seroquel, Depakote, Trazodone, Namenda, and Ativan  Unable to obtain psychiatric ROS, MSE, or any further history due to patient's lack of ability to cooperate in interview  Psychiatric Review Of Systems:    sleep: Unable to assess, patient unable to cooperate in interview  appetite changes: Unable to assess, patient unable to cooperate in interview  weight changes: Unable to assess, patient unable to cooperate in interview  energy/anergy: Unable to assess, patient unable to cooperate in interview  interest/pleasure/anhedonia: Unable to assess, patient unable to cooperate in interview  somatic symptoms: Unable to assess, patient unable to cooperate in interview  Anxiety/panic: Unable to assess, patient unable to cooperate in interview  Jo Ann: Unable to assess, patient unable to cooperate in interview  guilty/hopeless:  Unable to assess, patient unable to cooperate in interview  self injurious behavior/risky behavior: Unable to assess, patient unable to cooperate in interview    Historical Information     Past Psychiatric History:     Psychiatric Hospitalizations:   • Unclear history of past psychiatric admissions, collateral is unsure   Unable to assess, patient unable to cooperate in interview  Outpatient Treatment History:   • Unable to assess, patient unable to cooperate in interview; per collateral, patient is in hospice care and hospice physician is filling his psych medications  Suicide Attempts:   • Unable to assess, patient unable to cooperate in interview  History of self-harm:   • Unable to assess, patient unable to cooperate in interview  Violence History:   • 6-month history of sundowning with violence and aggression, no prior violence history  Past Psychiatric medication trials: Unable to assess, patient unable to cooperate in interview    Substance Abuse History:    Unable to assess, patient unable to cooperate in interview  Use of Alcohol: Unable to assess, patient unable to cooperate in interview  Longest clean time: Unable to assess, patient unable to cooperate in interview  History of IP/OP rehabilitation program: Unable to assess, patient unable to cooperate in interview  Smoking history: Unable to assess, patient unable to cooperate in interview  Use of Caffeine: Unable to assess, patient unable to cooperate in interview    Family Psychiatric History:      Unable to assess, patient unable to cooperate in interview    Social History:    Education: high school diploma/GED  Learning Disabilities: unknown, unable to assess, patient unable to cooperate in interview  Marital history: co-habitating, female partner of 43 years  Children: 2 with previous marriage  Living arrangement, social support: The patient presently lives in a nursing [facility]  Occupational History: retired [employer]   Functioning Relationships: good support system and good relationship with spouse or significant other    Other Pertinent History:  Service: branch: [employer] during [place] War    Traumatic History:     Abuse: Unable to assess, patient unable to cooperate in interview  Other Traumatic Events: Unable to assess, patient unable to cooperate in interview    Past Medical History:   Diagnosis Date   • Hypertension        Medical Review Of Systems:    Review of Systems - unable to perform ROS: dementia    Meds/Allergies     all current active meds have been reviewed  No Known Allergies    Objective     Vital signs in last 24 hours:  Temp:  [97 °F (36 1 °C)-97 4 °F (36 3 °C)] 97 4 °F (36 3 °C)  HR:  [74-75] 74  Resp:  [18] 18  BP: (108-161)/(61-95) 161/95    No intake or output data in the 24 hours ending 12/06/22 1521    Mental Status Evaluation:    Appearance:  age appropriate, disheveled and well-nourished   Behavior:  confused, disoriented, altered mental status   Speech:  no speech, patient unable to cooperate in interview   Mood:  patient unable to cooperate in interview   Affect:  constricted and inappropriate   Language: patient unable to cooperate in interview   Thought Process:  patient unable to cooperate in interview   Associations patient is non-verbal, patient unable to cooperate in interview   Thought Content:  patient unable to cooperate in interview   Perceptual Disturbances: patient unable to cooperate in interview   Risk Potential: Suicidal Ideations unknown, patient unable to cooperate in interview  Homicidal Ideations patient unable to cooperate in interview  Potential for Aggression Yes, recent violent episode   Sensorium:  patient is not oriented, unable to cooperate in interview   Cognition:  recent and remote memory: unable to assess due to lack of cooperation, patient does not answer, per collateral and chart review, patient has poor memory and recall   Consciousness:  awake    Attention: Poor   Intellect: Unable to examine, patient unable to cooperate in interview   Fund of Knowledge: patient unable to cooperate in interview   Insight:  patient unable to cooperate in interview   Judgment: patient unable to cooperate in interview   Muscle Strength:  Muscle Tone: unable to evaluate, patient unable to cooperate   Gait/Station: unable to evaluate during virtual consult   Motor Activity: no abnormal movements       Lab Results: I have personally reviewed all pertinent laboratory/tests results  Most Recent Labs:   Lab Results   Component Value Date    WBC 6 94 10/31/2022    RBC 4 53 10/31/2022    HGB 13 7 10/31/2022    HCT 40 7 10/31/2022     10/31/2022    RDW 13 1 10/31/2022    NEUTROABS 4 40 10/31/2022    SODIUM 142 11/02/2022    K 3 6 11/02/2022     11/02/2022    CO2 24 11/02/2022    BUN 14 11/02/2022    CREATININE 1 04 11/02/2022    GLUC 101 11/02/2022    CALCIUM 8 7 11/02/2022    AST 25 10/30/2022    ALT 13 10/30/2022    ALKPHOS 64 10/30/2022    TP 6 4 10/30/2022    ALB 3 1 (L) 10/30/2022    TBILI 0 59 10/30/2022    CHOLESTEROL 159 10/23/2018    HDL 34 (L) 10/23/2018    TRIG 260 (H) 10/23/2018    LDLCALC 73 10/23/2018    NONHDLC 125 10/23/2018    VALPROICTOT 12 (L) 10/28/2022    BKE9SSVPSGLX 2 607 10/26/2022       Imaging Studies: No results found  EKG/Pathology/Other Studies:   Lab Results   Component Value Date    VENTRATE 79 10/27/2022    ATRIALRATE 79 10/27/2022    PRINT 150 10/27/2022    QRSDINT 82 10/27/2022    QTINT 364 10/27/2022    QTCINT 417 10/27/2022    PAXIS 54 10/27/2022    QRSAXIS 76 10/27/2022    TWAVEAXIS 44 10/27/2022        Code Status: Prior  Advance Directive and Living Will: Yes    Power of :    POLST:      Counseling / Coordination of Care: Total floor / unit time spent today 60 minutes  Greater than 50% of total time was spent with the patient and / or family counseling and / or coordination of care   A description of the counseling / coordination of care:

## 2022-12-07 ENCOUNTER — HOME CARE VISIT (OUTPATIENT)
Dept: HOME HEALTH SERVICES | Facility: HOME HEALTHCARE | Age: 87
End: 2022-12-07

## 2022-12-07 ENCOUNTER — HOME CARE VISIT (OUTPATIENT)
Dept: HOME HOSPICE | Facility: HOSPICE | Age: 87
End: 2022-12-07

## 2022-12-08 ENCOUNTER — HOME CARE VISIT (OUTPATIENT)
Dept: HOME HEALTH SERVICES | Facility: HOME HEALTHCARE | Age: 87
End: 2022-12-08

## 2022-12-08 VITALS — TEMPERATURE: 97.1 F | RESPIRATION RATE: 16 BRPM

## 2022-12-09 ENCOUNTER — HOME CARE VISIT (OUTPATIENT)
Dept: HOME HOSPICE | Facility: HOSPICE | Age: 87
End: 2022-12-09

## 2022-12-09 DIAGNOSIS — Z51.5 HOSPICE CARE: ICD-10-CM

## 2022-12-09 NOTE — TELEPHONE ENCOUNTER
12/9/2022 10:48 AM  [facility] facility patient requests medication refill  Filled electronically via Epic as per [place] State Law  Requested Prescriptions     Signed Prescriptions Disp Refills   • zinc oxide (BALMEX) 11 3 % cream 56 g 0     Sig: Apply topically 3 (three) times a day as needed (skin irritation) Apply thin coat over nystatin powder to groin - should almost be able to see through     Authorizing Provider: Corry Erickson 77 Answering Service: [phone]  You can find me on [employer]!

## 2022-12-10 ENCOUNTER — HOME CARE VISIT (OUTPATIENT)
Dept: HOME HOSPICE | Facility: HOSPICE | Age: 87
End: 2022-12-10

## 2022-12-11 ENCOUNTER — HOME CARE VISIT (OUTPATIENT)
Dept: HOME HEALTH SERVICES | Facility: HOME HEALTHCARE | Age: 87
End: 2022-12-11

## 2022-12-11 ENCOUNTER — HOME CARE VISIT (OUTPATIENT)
Dept: HOME HOSPICE | Facility: HOSPICE | Age: 87
End: 2022-12-11

## 2022-12-11 VITALS
RESPIRATION RATE: 16 BRPM | SYSTOLIC BLOOD PRESSURE: 172 MMHG | HEART RATE: 84 BPM | TEMPERATURE: 98.4 F | DIASTOLIC BLOOD PRESSURE: 94 MMHG

## 2022-12-12 ENCOUNTER — HOME CARE VISIT (OUTPATIENT)
Dept: HOME HOSPICE | Facility: HOSPICE | Age: 87
End: 2022-12-12

## 2022-12-12 ENCOUNTER — HOME CARE VISIT (OUTPATIENT)
Dept: HOME HEALTH SERVICES | Facility: HOME HEALTHCARE | Age: 87
End: 2022-12-12

## 2022-12-12 VITALS
HEART RATE: 72 BPM | DIASTOLIC BLOOD PRESSURE: 72 MMHG | TEMPERATURE: 96 F | RESPIRATION RATE: 16 BRPM | HEART RATE: 76 BPM | SYSTOLIC BLOOD PRESSURE: 140 MMHG | RESPIRATION RATE: 18 BRPM | TEMPERATURE: 97.1 F

## 2022-12-12 VITALS
SYSTOLIC BLOOD PRESSURE: 159 MMHG | DIASTOLIC BLOOD PRESSURE: 92 MMHG | HEART RATE: 62 BPM | RESPIRATION RATE: 18 BRPM | TEMPERATURE: 97.3 F

## 2022-12-13 ENCOUNTER — HOME CARE VISIT (OUTPATIENT)
Dept: HOME HOSPICE | Facility: HOSPICE | Age: 87
End: 2022-12-13

## 2022-12-13 ENCOUNTER — HOME CARE VISIT (OUTPATIENT)
Dept: HOME HEALTH SERVICES | Facility: HOME HEALTHCARE | Age: 87
End: 2022-12-13

## 2022-12-13 VITALS — RESPIRATION RATE: 18 BRPM | TEMPERATURE: 98.9 F | HEART RATE: 85 BPM

## 2022-12-14 ENCOUNTER — HOME CARE VISIT (OUTPATIENT)
Dept: HOME HEALTH SERVICES | Facility: HOME HEALTHCARE | Age: 87
End: 2022-12-14

## 2022-12-14 NOTE — HOSPICE
PRN vs made to assess pt s/p unwitnessed fall  Vicki Kamara pt was found on floor next to chair and bed   two new contusions noted on bilateral knees   skin red no drainage  area about size of quarter noted  dry clean dressing applied to both contusions and tubular elastic band applied to hold 4x4s in place  pt laying in bed when sn arrived non responsive and sleeping  no other open areas noted  no other visual bruising  staff will all if any questions or concerns

## 2022-12-15 ENCOUNTER — HOME CARE VISIT (OUTPATIENT)
Dept: HOME HOSPICE | Facility: HOSPICE | Age: 87
End: 2022-12-15

## 2022-12-15 VITALS — RESPIRATION RATE: 16 BRPM | TEMPERATURE: 98 F | HEART RATE: 80 BPM

## 2022-12-16 ENCOUNTER — HOME CARE VISIT (OUTPATIENT)
Dept: HOME HEALTH SERVICES | Facility: HOME HEALTHCARE | Age: 87
End: 2022-12-16

## 2022-12-16 VITALS — HEART RATE: 88 BPM | TEMPERATURE: 97.2 F | RESPIRATION RATE: 16 BRPM

## 2023-01-02 ENCOUNTER — HOME CARE VISIT (OUTPATIENT)
Dept: HOME HOSPICE | Facility: HOSPICE | Age: 88
End: 2023-01-02

## 2023-01-03 ENCOUNTER — HOME CARE VISIT (OUTPATIENT)
Dept: HOME HOSPICE | Facility: HOSPICE | Age: 88
End: 2023-01-03

## 2023-01-06 ENCOUNTER — HOME CARE VISIT (OUTPATIENT)
Dept: HOME HOSPICE | Facility: HOSPICE | Age: 88
End: 2023-01-06